# Patient Record
Sex: FEMALE | Race: WHITE | NOT HISPANIC OR LATINO | URBAN - METROPOLITAN AREA
[De-identification: names, ages, dates, MRNs, and addresses within clinical notes are randomized per-mention and may not be internally consistent; named-entity substitution may affect disease eponyms.]

---

## 2017-01-30 ENCOUNTER — OFFICE VISIT - HEALTHEAST (OUTPATIENT)
Dept: GERIATRICS | Facility: CLINIC | Age: 72
End: 2017-01-30

## 2017-01-30 DIAGNOSIS — F22 DELUSIONAL DISORDER, SOMATIC TYPE (H): ICD-10-CM

## 2017-01-30 DIAGNOSIS — G47.00 INSOMNIA, UNSPECIFIED TYPE: ICD-10-CM

## 2017-01-30 DIAGNOSIS — F32.A DEPRESSION: ICD-10-CM

## 2017-01-30 DIAGNOSIS — F43.10 PTSD (POST-TRAUMATIC STRESS DISORDER): ICD-10-CM

## 2017-01-30 DIAGNOSIS — F41.9 ANXIETY: ICD-10-CM

## 2017-01-30 DIAGNOSIS — K59.00 CONSTIPATION, UNSPECIFIED CONSTIPATION TYPE: ICD-10-CM

## 2017-03-30 ENCOUNTER — OFFICE VISIT - HEALTHEAST (OUTPATIENT)
Dept: GERIATRICS | Facility: CLINIC | Age: 72
End: 2017-03-30

## 2017-03-30 DIAGNOSIS — F45.0 SOMATIZATION DISORDER: ICD-10-CM

## 2017-03-30 DIAGNOSIS — F22 DELUSIONAL DISORDER, SOMATIC TYPE (H): ICD-10-CM

## 2017-03-30 DIAGNOSIS — K59.00 CONSTIPATION, UNSPECIFIED CONSTIPATION TYPE: ICD-10-CM

## 2017-03-30 DIAGNOSIS — F32.A DEPRESSION: ICD-10-CM

## 2017-03-30 DIAGNOSIS — F43.10 PTSD (POST-TRAUMATIC STRESS DISORDER): ICD-10-CM

## 2017-03-30 DIAGNOSIS — F41.9 ANXIETY: ICD-10-CM

## 2017-04-11 ENCOUNTER — OFFICE VISIT - HEALTHEAST (OUTPATIENT)
Dept: GERIATRICS | Facility: CLINIC | Age: 72
End: 2017-04-11

## 2017-04-11 DIAGNOSIS — G47.00 INSOMNIA, UNSPECIFIED TYPE: ICD-10-CM

## 2017-04-11 DIAGNOSIS — K59.00 CONSTIPATION, UNSPECIFIED CONSTIPATION TYPE: ICD-10-CM

## 2017-04-11 DIAGNOSIS — F22 DELUSIONAL DISORDER, SOMATIC TYPE (H): ICD-10-CM

## 2017-04-11 DIAGNOSIS — F41.9 ANXIETY: ICD-10-CM

## 2017-04-11 DIAGNOSIS — G20.C PARKINSONISM, UNSPECIFIED PARKINSONISM TYPE (H): ICD-10-CM

## 2017-04-11 DIAGNOSIS — F43.10 PTSD (POST-TRAUMATIC STRESS DISORDER): ICD-10-CM

## 2017-04-11 DIAGNOSIS — F32.A DEPRESSION: ICD-10-CM

## 2017-04-28 ENCOUNTER — OFFICE VISIT - HEALTHEAST (OUTPATIENT)
Dept: GERIATRICS | Facility: CLINIC | Age: 72
End: 2017-04-28

## 2017-04-28 ENCOUNTER — RECORDS - HEALTHEAST (OUTPATIENT)
Dept: ADMINISTRATIVE | Facility: OTHER | Age: 72
End: 2017-04-28

## 2017-04-28 DIAGNOSIS — F43.10 PTSD (POST-TRAUMATIC STRESS DISORDER): ICD-10-CM

## 2017-04-28 DIAGNOSIS — F32.A DEPRESSION: ICD-10-CM

## 2017-04-28 DIAGNOSIS — G21.9 SECONDARY PARKINSONISM, UNSPECIFIED SECONDARY PARKINSONISM TYPE (H): ICD-10-CM

## 2017-04-28 DIAGNOSIS — K59.00 CONSTIPATION, UNSPECIFIED CONSTIPATION TYPE: ICD-10-CM

## 2017-04-28 DIAGNOSIS — F22 DELUSIONAL DISORDER, SOMATIC TYPE (H): ICD-10-CM

## 2017-04-28 DIAGNOSIS — F50.9 EATING DISORDER: ICD-10-CM

## 2017-04-28 DIAGNOSIS — H26.9 CATARACTS, BILATERAL: ICD-10-CM

## 2017-04-28 DIAGNOSIS — G47.00 INSOMNIA, UNSPECIFIED TYPE: ICD-10-CM

## 2017-04-28 DIAGNOSIS — F41.9 ANXIETY: ICD-10-CM

## 2017-04-28 RX ORDER — ZALEPLON 10 MG/1
10 CAPSULE ORAL
Status: SHIPPED | COMMUNITY
Start: 2016-09-30 | End: 2023-02-23

## 2017-04-28 RX ORDER — DEXTROMETHORPHAN HBR. AND GUAIFENESIN 10; 100 MG/5ML; MG/5ML
5 SOLUTION ORAL EVERY 4 HOURS PRN
Status: SHIPPED | COMMUNITY
Start: 2017-04-28 | End: 2023-02-23

## 2017-05-08 ENCOUNTER — OFFICE VISIT - HEALTHEAST (OUTPATIENT)
Dept: CARDIOLOGY | Facility: CLINIC | Age: 72
End: 2017-05-08

## 2017-05-08 DIAGNOSIS — I44.7 LBBB (LEFT BUNDLE BRANCH BLOCK): ICD-10-CM

## 2017-05-08 DIAGNOSIS — Z01.810 PREOP CARDIOVASCULAR EXAM: ICD-10-CM

## 2017-05-08 LAB
ATRIAL RATE - MUSE: 80 BPM
DIASTOLIC BLOOD PRESSURE - MUSE: NORMAL MMHG
INTERPRETATION ECG - MUSE: NORMAL
P AXIS - MUSE: 60 DEGREES
PR INTERVAL - MUSE: 124 MS
QRS DURATION - MUSE: 134 MS
QT - MUSE: 430 MS
QTC - MUSE: 495 MS
R AXIS - MUSE: -13 DEGREES
SYSTOLIC BLOOD PRESSURE - MUSE: NORMAL MMHG
T AXIS - MUSE: 75 DEGREES
VENTRICULAR RATE- MUSE: 80 BPM

## 2017-05-08 RX ORDER — MOXIFLOXACIN 5 MG/ML
1 SOLUTION/ DROPS OPHTHALMIC 4 TIMES DAILY
Status: SHIPPED | COMMUNITY
Start: 2017-05-08 | End: 2023-02-23

## 2017-05-08 ASSESSMENT — MIFFLIN-ST. JEOR: SCORE: 1134.06

## 2017-05-30 ENCOUNTER — OFFICE VISIT - HEALTHEAST (OUTPATIENT)
Dept: GERIATRICS | Facility: CLINIC | Age: 72
End: 2017-05-30

## 2017-05-30 DIAGNOSIS — F22 DELUSIONAL DISORDER, SOMATIC TYPE (H): ICD-10-CM

## 2017-05-30 DIAGNOSIS — F32.A DEPRESSION, UNSPECIFIED DEPRESSION TYPE: ICD-10-CM

## 2017-05-30 DIAGNOSIS — F50.9 EATING DISORDER: ICD-10-CM

## 2017-05-30 DIAGNOSIS — G21.9 SECONDARY PARKINSONISM, UNSPECIFIED SECONDARY PARKINSONISM TYPE (H): ICD-10-CM

## 2017-05-30 DIAGNOSIS — F43.10 PTSD (POST-TRAUMATIC STRESS DISORDER): ICD-10-CM

## 2017-05-30 DIAGNOSIS — F41.9 ANXIETY: ICD-10-CM

## 2017-05-30 DIAGNOSIS — K59.00 CONSTIPATION, UNSPECIFIED CONSTIPATION TYPE: ICD-10-CM

## 2017-05-30 DIAGNOSIS — G47.00 INSOMNIA, UNSPECIFIED TYPE: ICD-10-CM

## 2017-05-30 RX ORDER — MAGNESIUM HYDROXIDE/ALUMINUM HYDROXICE/SIMETHICONE 120; 1200; 1200 MG/30ML; MG/30ML; MG/30ML
30 SUSPENSION ORAL 3 TIMES DAILY PRN
Status: SHIPPED | COMMUNITY
Start: 2017-05-30 | End: 2023-02-23

## 2017-05-30 ASSESSMENT — MIFFLIN-ST. JEOR: SCORE: 1129.53

## 2017-06-28 ENCOUNTER — OFFICE VISIT - HEALTHEAST (OUTPATIENT)
Dept: GERIATRICS | Facility: CLINIC | Age: 72
End: 2017-06-28

## 2017-06-28 DIAGNOSIS — F41.9 ANXIETY: ICD-10-CM

## 2017-06-28 DIAGNOSIS — F22 DELUSIONAL DISORDER, SOMATIC TYPE (H): ICD-10-CM

## 2017-06-28 DIAGNOSIS — R52 PAIN MANAGEMENT: ICD-10-CM

## 2017-06-28 DIAGNOSIS — F32.A DEPRESSION, UNSPECIFIED DEPRESSION TYPE: ICD-10-CM

## 2017-06-28 DIAGNOSIS — G47.00 INSOMNIA, UNSPECIFIED TYPE: ICD-10-CM

## 2017-07-18 ENCOUNTER — OFFICE VISIT - HEALTHEAST (OUTPATIENT)
Dept: GERIATRICS | Facility: CLINIC | Age: 72
End: 2017-07-18

## 2017-07-18 DIAGNOSIS — F50.9 EATING DISORDER: ICD-10-CM

## 2017-07-18 DIAGNOSIS — F22 DELUSIONAL DISORDER, SOMATIC TYPE (H): ICD-10-CM

## 2017-07-18 DIAGNOSIS — F43.10 PTSD (POST-TRAUMATIC STRESS DISORDER): ICD-10-CM

## 2017-07-18 DIAGNOSIS — G21.9 SECONDARY PARKINSONISM, UNSPECIFIED SECONDARY PARKINSONISM TYPE (H): ICD-10-CM

## 2017-07-18 DIAGNOSIS — G47.00 INSOMNIA, UNSPECIFIED TYPE: ICD-10-CM

## 2017-07-18 DIAGNOSIS — F32.A DEPRESSION, UNSPECIFIED DEPRESSION TYPE: ICD-10-CM

## 2017-07-18 DIAGNOSIS — K59.00 CONSTIPATION, UNSPECIFIED CONSTIPATION TYPE: ICD-10-CM

## 2017-07-18 ASSESSMENT — MIFFLIN-ST. JEOR: SCORE: 1184.86

## 2017-08-15 ENCOUNTER — OFFICE VISIT - HEALTHEAST (OUTPATIENT)
Dept: GERIATRICS | Facility: CLINIC | Age: 72
End: 2017-08-15

## 2017-08-15 DIAGNOSIS — F50.9 EATING DISORDER: ICD-10-CM

## 2017-08-15 DIAGNOSIS — F32.A DEPRESSION, UNSPECIFIED DEPRESSION TYPE: ICD-10-CM

## 2017-08-15 DIAGNOSIS — F43.10 PTSD (POST-TRAUMATIC STRESS DISORDER): ICD-10-CM

## 2017-08-15 DIAGNOSIS — G47.00 INSOMNIA, UNSPECIFIED TYPE: ICD-10-CM

## 2017-08-15 DIAGNOSIS — G25.81 RLS (RESTLESS LEGS SYNDROME): ICD-10-CM

## 2017-08-15 DIAGNOSIS — F22 DELUSIONAL DISORDER, SOMATIC TYPE (H): ICD-10-CM

## 2017-08-15 DIAGNOSIS — K59.00 CONSTIPATION, UNSPECIFIED CONSTIPATION TYPE: ICD-10-CM

## 2017-08-15 DIAGNOSIS — G21.9 SECONDARY PARKINSONISM, UNSPECIFIED SECONDARY PARKINSONISM TYPE (H): ICD-10-CM

## 2017-08-17 ASSESSMENT — MIFFLIN-ST. JEOR: SCORE: 1214.8

## 2017-08-29 ENCOUNTER — AMBULATORY - HEALTHEAST (OUTPATIENT)
Dept: GERIATRICS | Facility: CLINIC | Age: 72
End: 2017-08-29

## 2017-08-29 RX ORDER — ROPINIROLE 1 MG/1
2 TABLET, FILM COATED ORAL
Status: SHIPPED | COMMUNITY
Start: 2017-11-24 | End: 2023-02-23

## 2017-09-27 ENCOUNTER — OFFICE VISIT - HEALTHEAST (OUTPATIENT)
Dept: GERIATRICS | Facility: CLINIC | Age: 72
End: 2017-09-27

## 2017-09-27 DIAGNOSIS — K59.00 CONSTIPATION, UNSPECIFIED CONSTIPATION TYPE: ICD-10-CM

## 2017-09-27 DIAGNOSIS — F22 DELUSIONAL DISORDER, SOMATIC TYPE (H): ICD-10-CM

## 2017-09-27 DIAGNOSIS — F41.9 ANXIETY: ICD-10-CM

## 2017-09-27 DIAGNOSIS — F50.9 EATING DISORDER: ICD-10-CM

## 2017-09-27 DIAGNOSIS — F32.A DEPRESSION, UNSPECIFIED DEPRESSION TYPE: ICD-10-CM

## 2017-09-27 DIAGNOSIS — G21.9 SECONDARY PARKINSONISM, UNSPECIFIED SECONDARY PARKINSONISM TYPE (H): ICD-10-CM

## 2017-09-27 DIAGNOSIS — R63.5 WEIGHT GAIN: ICD-10-CM

## 2017-09-27 DIAGNOSIS — G47.00 INSOMNIA, UNSPECIFIED TYPE: ICD-10-CM

## 2017-09-27 DIAGNOSIS — R52 PAIN MANAGEMENT: ICD-10-CM

## 2017-09-27 DIAGNOSIS — F43.10 PTSD (POST-TRAUMATIC STRESS DISORDER): ICD-10-CM

## 2017-10-20 ENCOUNTER — OFFICE VISIT - HEALTHEAST (OUTPATIENT)
Dept: GERIATRICS | Facility: CLINIC | Age: 72
End: 2017-10-20

## 2017-10-20 DIAGNOSIS — G21.9 SECONDARY PARKINSONISM, UNSPECIFIED SECONDARY PARKINSONISM TYPE (H): ICD-10-CM

## 2017-10-20 DIAGNOSIS — F51.04 PSYCHOPHYSIOLOGICAL INSOMNIA: ICD-10-CM

## 2017-10-20 DIAGNOSIS — F43.10 PTSD (POST-TRAUMATIC STRESS DISORDER): ICD-10-CM

## 2017-10-20 DIAGNOSIS — G25.81 RLS (RESTLESS LEGS SYNDROME): ICD-10-CM

## 2017-10-20 DIAGNOSIS — F22 DELUSIONAL DISORDER, SOMATIC TYPE (H): ICD-10-CM

## 2017-10-20 DIAGNOSIS — K59.00 CONSTIPATION, UNSPECIFIED CONSTIPATION TYPE: ICD-10-CM

## 2017-10-20 DIAGNOSIS — F50.9 EATING DISORDER: ICD-10-CM

## 2017-10-20 DIAGNOSIS — F32.A DEPRESSION, UNSPECIFIED DEPRESSION TYPE: ICD-10-CM

## 2017-10-24 RX ORDER — FLUVOXAMINE MALEATE 50 MG
50 TABLET ORAL 2 TIMES DAILY
Status: SHIPPED | COMMUNITY
Start: 2017-08-30 | End: 2023-02-23

## 2017-10-24 ASSESSMENT — MIFFLIN-ST. JEOR: SCORE: 1233.4

## 2017-11-02 ENCOUNTER — AMBULATORY - HEALTHEAST (OUTPATIENT)
Dept: GERIATRICS | Facility: CLINIC | Age: 72
End: 2017-11-02

## 2017-11-03 ENCOUNTER — AMBULATORY - HEALTHEAST (OUTPATIENT)
Dept: GERIATRICS | Facility: CLINIC | Age: 72
End: 2017-11-03

## 2017-11-07 ENCOUNTER — AMBULATORY - HEALTHEAST (OUTPATIENT)
Dept: GERIATRICS | Facility: CLINIC | Age: 72
End: 2017-11-07

## 2017-11-24 ENCOUNTER — OFFICE VISIT - HEALTHEAST (OUTPATIENT)
Dept: GERIATRICS | Facility: CLINIC | Age: 72
End: 2017-11-24

## 2017-11-24 DIAGNOSIS — F22 DELUSIONAL DISORDER, SOMATIC TYPE (H): ICD-10-CM

## 2017-11-24 DIAGNOSIS — R19.8 IRREGULAR BOWEL HABITS: ICD-10-CM

## 2017-11-24 DIAGNOSIS — G25.81 RLS (RESTLESS LEGS SYNDROME): ICD-10-CM

## 2017-11-24 DIAGNOSIS — F50.9 EATING DISORDER: ICD-10-CM

## 2017-11-24 DIAGNOSIS — F43.10 PTSD (POST-TRAUMATIC STRESS DISORDER): ICD-10-CM

## 2017-11-24 DIAGNOSIS — F32.A DEPRESSION, UNSPECIFIED DEPRESSION TYPE: ICD-10-CM

## 2017-11-24 DIAGNOSIS — G21.9 SECONDARY PARKINSONISM, UNSPECIFIED SECONDARY PARKINSONISM TYPE (H): ICD-10-CM

## 2017-11-24 DIAGNOSIS — F51.04 PSYCHOPHYSIOLOGICAL INSOMNIA: ICD-10-CM

## 2017-11-24 RX ORDER — LOPERAMIDE HYDROCHLORIDE 2 MG/1
2 TABLET ORAL 4 TIMES DAILY PRN
Status: SHIPPED | COMMUNITY
Start: 2017-11-24 | End: 2023-02-23

## 2017-11-24 ASSESSMENT — MIFFLIN-ST. JEOR: SCORE: 1241.11

## 2017-11-26 RX ORDER — FUROSEMIDE 20 MG
20 TABLET ORAL DAILY
Status: SHIPPED | COMMUNITY
Start: 2017-11-01 | End: 2023-02-23

## 2018-01-30 ENCOUNTER — OFFICE VISIT - HEALTHEAST (OUTPATIENT)
Dept: GERIATRICS | Facility: CLINIC | Age: 73
End: 2018-01-30

## 2018-01-30 DIAGNOSIS — F43.10 PTSD (POST-TRAUMATIC STRESS DISORDER): ICD-10-CM

## 2018-01-30 DIAGNOSIS — K59.00 CONSTIPATION, UNSPECIFIED CONSTIPATION TYPE: ICD-10-CM

## 2018-01-30 DIAGNOSIS — R19.8 IRREGULAR BOWEL HABITS: ICD-10-CM

## 2018-01-30 DIAGNOSIS — F22 DELUSIONAL DISORDER, SOMATIC TYPE (H): ICD-10-CM

## 2018-01-30 DIAGNOSIS — R52 PAIN MANAGEMENT: ICD-10-CM

## 2018-02-16 ENCOUNTER — AMBULATORY - HEALTHEAST (OUTPATIENT)
Dept: GERIATRICS | Facility: CLINIC | Age: 73
End: 2018-02-16

## 2018-02-22 ENCOUNTER — AMBULATORY - HEALTHEAST (OUTPATIENT)
Dept: GERIATRICS | Facility: CLINIC | Age: 73
End: 2018-02-22

## 2018-02-22 RX ORDER — AMOXICILLIN 250 MG
1 CAPSULE ORAL 2 TIMES DAILY PRN
Status: SHIPPED | COMMUNITY
Start: 2018-02-22

## 2018-03-02 ENCOUNTER — AMBULATORY - HEALTHEAST (OUTPATIENT)
Dept: GERIATRICS | Facility: CLINIC | Age: 73
End: 2018-03-02

## 2019-05-14 ENCOUNTER — RECORDS - HEALTHEAST (OUTPATIENT)
Dept: LAB | Facility: CLINIC | Age: 74
End: 2019-05-14

## 2019-05-14 LAB
25(OH)D3 SERPL-MCNC: 29 NG/ML (ref 30–80)
ALBUMIN SERPL-MCNC: 4.3 G/DL (ref 3.5–5)
ALP SERPL-CCNC: 105 U/L (ref 45–120)
ALT SERPL W P-5'-P-CCNC: 34 U/L (ref 0–45)
ANION GAP SERPL CALCULATED.3IONS-SCNC: 13 MMOL/L (ref 5–18)
AST SERPL W P-5'-P-CCNC: 35 U/L (ref 0–40)
BILIRUB SERPL-MCNC: 0.6 MG/DL (ref 0–1)
BUN SERPL-MCNC: 18 MG/DL (ref 8–28)
CALCIUM SERPL-MCNC: 10.1 MG/DL (ref 8.5–10.5)
CHLORIDE BLD-SCNC: 102 MMOL/L (ref 98–107)
CHOLEST SERPL-MCNC: 180 MG/DL
CO2 SERPL-SCNC: 21 MMOL/L (ref 22–31)
CREAT SERPL-MCNC: 0.81 MG/DL (ref 0.6–1.1)
ERYTHROCYTE [DISTWIDTH] IN BLOOD BY AUTOMATED COUNT: 15.9 % (ref 11–14.5)
FASTING STATUS PATIENT QL REPORTED: NORMAL
FERRITIN SERPL-MCNC: 47 NG/ML (ref 10–130)
GFR SERPL CREATININE-BSD FRML MDRD: >60 ML/MIN/1.73M2
GLUCOSE BLD-MCNC: 82 MG/DL (ref 70–125)
HCT VFR BLD AUTO: 45.7 % (ref 35–47)
HDLC SERPL-MCNC: 88 MG/DL
HGB BLD-MCNC: 14.7 G/DL (ref 12–16)
IRON SERPL-MCNC: 122 UG/DL (ref 42–175)
LDLC SERPL CALC-MCNC: 74 MG/DL
MCH RBC QN AUTO: 28.8 PG (ref 27–34)
MCHC RBC AUTO-ENTMCNC: 32.2 G/DL (ref 32–36)
MCV RBC AUTO: 89 FL (ref 80–100)
PLATELET # BLD AUTO: 274 THOU/UL (ref 140–440)
PMV BLD AUTO: 10.1 FL (ref 8.5–12.5)
POTASSIUM BLD-SCNC: 4.2 MMOL/L (ref 3.5–5)
PROT SERPL-MCNC: 7.8 G/DL (ref 6–8)
RBC # BLD AUTO: 5.11 MILL/UL (ref 3.8–5.4)
SODIUM SERPL-SCNC: 136 MMOL/L (ref 136–145)
TRIGL SERPL-MCNC: 89 MG/DL
TSH SERPL DL<=0.005 MIU/L-ACNC: 3.6 UIU/ML (ref 0.3–5)
VIT B12 SERPL-MCNC: 590 PG/ML (ref 213–816)
WBC: 8.4 THOU/UL (ref 4–11)

## 2019-06-11 ENCOUNTER — RECORDS - HEALTHEAST (OUTPATIENT)
Dept: LAB | Facility: CLINIC | Age: 74
End: 2019-06-11

## 2019-06-11 LAB
ANION GAP SERPL CALCULATED.3IONS-SCNC: 10 MMOL/L (ref 5–18)
BASOPHILS # BLD AUTO: 0.1 THOU/UL (ref 0–0.2)
BASOPHILS NFR BLD AUTO: 1 % (ref 0–2)
BUN SERPL-MCNC: 21 MG/DL (ref 8–28)
CALCIUM SERPL-MCNC: 9.8 MG/DL (ref 8.5–10.5)
CHLORIDE BLD-SCNC: 103 MMOL/L (ref 98–107)
CO2 SERPL-SCNC: 23 MMOL/L (ref 22–31)
CREAT SERPL-MCNC: 0.83 MG/DL (ref 0.6–1.1)
EOSINOPHIL # BLD AUTO: 0.4 THOU/UL (ref 0–0.4)
EOSINOPHIL NFR BLD AUTO: 5 % (ref 0–6)
ERYTHROCYTE [DISTWIDTH] IN BLOOD BY AUTOMATED COUNT: 15.9 % (ref 11–14.5)
GFR SERPL CREATININE-BSD FRML MDRD: >60 ML/MIN/1.73M2
GLUCOSE BLD-MCNC: 115 MG/DL (ref 70–125)
HCT VFR BLD AUTO: 39.5 % (ref 35–47)
HGB BLD-MCNC: 12.9 G/DL (ref 12–16)
LYMPHOCYTES # BLD AUTO: 2.5 THOU/UL (ref 0.8–4.4)
LYMPHOCYTES NFR BLD AUTO: 35 % (ref 20–40)
MCH RBC QN AUTO: 29.5 PG (ref 27–34)
MCHC RBC AUTO-ENTMCNC: 32.7 G/DL (ref 32–36)
MCV RBC AUTO: 90 FL (ref 80–100)
MONOCYTES # BLD AUTO: 0.6 THOU/UL (ref 0–0.9)
MONOCYTES NFR BLD AUTO: 8 % (ref 2–10)
NEUTROPHILS # BLD AUTO: 3.7 THOU/UL (ref 2–7.7)
NEUTROPHILS NFR BLD AUTO: 52 % (ref 50–70)
PLATELET # BLD AUTO: 245 THOU/UL (ref 140–440)
PMV BLD AUTO: 9.5 FL (ref 8.5–12.5)
POTASSIUM BLD-SCNC: 4.3 MMOL/L (ref 3.5–5)
RBC # BLD AUTO: 4.38 MILL/UL (ref 3.8–5.4)
SODIUM SERPL-SCNC: 136 MMOL/L (ref 136–145)
WBC: 7.2 THOU/UL (ref 4–11)

## 2019-07-05 ENCOUNTER — RECORDS - HEALTHEAST (OUTPATIENT)
Dept: LAB | Facility: CLINIC | Age: 74
End: 2019-07-05

## 2019-07-05 LAB
ANION GAP SERPL CALCULATED.3IONS-SCNC: 10 MMOL/L (ref 5–18)
BUN SERPL-MCNC: 21 MG/DL (ref 8–28)
CALCIUM SERPL-MCNC: 10 MG/DL (ref 8.5–10.5)
CHLORIDE BLD-SCNC: 105 MMOL/L (ref 98–107)
CO2 SERPL-SCNC: 23 MMOL/L (ref 22–31)
CREAT SERPL-MCNC: 0.81 MG/DL (ref 0.6–1.1)
GFR SERPL CREATININE-BSD FRML MDRD: >60 ML/MIN/1.73M2
GLUCOSE BLD-MCNC: 88 MG/DL (ref 70–125)
POTASSIUM BLD-SCNC: 4.2 MMOL/L (ref 3.5–5)
SODIUM SERPL-SCNC: 138 MMOL/L (ref 136–145)

## 2019-10-01 ENCOUNTER — RECORDS - HEALTHEAST (OUTPATIENT)
Dept: LAB | Facility: CLINIC | Age: 74
End: 2019-10-01

## 2019-10-01 LAB
CHOLEST SERPL-MCNC: 152 MG/DL
FASTING STATUS PATIENT QL REPORTED: YES
HDLC SERPL-MCNC: 84 MG/DL
LDLC SERPL CALC-MCNC: 50 MG/DL
TRIGL SERPL-MCNC: 91 MG/DL

## 2020-07-21 ENCOUNTER — RECORDS - HEALTHEAST (OUTPATIENT)
Dept: LAB | Facility: CLINIC | Age: 75
End: 2020-07-21

## 2020-07-24 LAB
SARS-COV-2 BY NAA - HISTORICAL: NOT DETECTED
SARS-COV-2 SOURCE - HISTORICAL: NORMAL

## 2021-02-27 ENCOUNTER — RECORDS - HEALTHEAST (OUTPATIENT)
Dept: LAB | Facility: HOSPITAL | Age: 76
End: 2021-02-27

## 2021-02-27 LAB
SARS-COV-2 PCR COMMENT: NORMAL
SARS-COV-2 RNA SPEC QL NAA+PROBE: NEGATIVE
SARS-COV-2 VIRUS SPECIMEN SOURCE: NORMAL

## 2021-04-02 ENCOUNTER — RECORDS - HEALTHEAST (OUTPATIENT)
Dept: LAB | Facility: CLINIC | Age: 76
End: 2021-04-02

## 2021-05-30 VITALS — WEIGHT: 150 LBS

## 2021-05-30 VITALS — WEIGHT: 134.9 LBS

## 2021-05-31 VITALS — HEIGHT: 63 IN | WEIGHT: 151 LBS | BODY MASS INDEX: 26.75 KG/M2

## 2021-05-31 VITALS — HEIGHT: 63 IN | BODY MASS INDEX: 30.94 KG/M2 | WEIGHT: 174.6 LBS

## 2021-05-31 VITALS — WEIGHT: 168.8 LBS | HEIGHT: 63 IN | BODY MASS INDEX: 29.91 KG/M2

## 2021-05-31 VITALS — BODY MASS INDEX: 30.64 KG/M2 | HEIGHT: 63 IN | WEIGHT: 172.9 LBS

## 2021-05-31 VITALS — WEIGHT: 162.2 LBS | BODY MASS INDEX: 28.74 KG/M2 | HEIGHT: 63 IN

## 2021-05-31 VITALS — HEIGHT: 63 IN | WEIGHT: 150 LBS | BODY MASS INDEX: 26.58 KG/M2
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
## 2021-06-15 PROBLEM — H26.9 CATARACTS, BILATERAL: Status: ACTIVE | Noted: 2017-04-28
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
## 2021-06-16 PROBLEM — G25.81 RLS (RESTLESS LEGS SYNDROME): Status: ACTIVE | Noted: 2017-08-17

## 2021-06-16 PROBLEM — R19.8 IRREGULAR BOWEL HABITS: Status: ACTIVE | Noted: 2017-11-24
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
## 2021-08-31 ENCOUNTER — LAB REQUISITION (OUTPATIENT)
Dept: LAB | Facility: CLINIC | Age: 76
End: 2021-08-31

## 2021-08-31 DIAGNOSIS — U07.1 COVID-19: ICD-10-CM

## 2021-09-07 ENCOUNTER — LAB REQUISITION (OUTPATIENT)
Dept: LAB | Facility: CLINIC | Age: 76
End: 2021-09-07

## 2021-09-07 DIAGNOSIS — U07.1 COVID-19: ICD-10-CM

## 2021-11-30 ENCOUNTER — LAB REQUISITION (OUTPATIENT)
Dept: LAB | Facility: CLINIC | Age: 76
End: 2021-11-30

## 2021-11-30 DIAGNOSIS — U07.1 COVID-19: ICD-10-CM

## 2021-12-07 ENCOUNTER — LAB REQUISITION (OUTPATIENT)
Dept: LAB | Facility: CLINIC | Age: 76
End: 2021-12-07

## 2021-12-07 DIAGNOSIS — U07.1 COVID-19: ICD-10-CM

## 2021-12-13 ENCOUNTER — LAB REQUISITION (OUTPATIENT)
Dept: LAB | Facility: CLINIC | Age: 76
End: 2021-12-13

## 2021-12-13 DIAGNOSIS — U07.1 COVID-19: ICD-10-CM

## 2021-12-20 ENCOUNTER — LAB REQUISITION (OUTPATIENT)
Dept: LAB | Facility: CLINIC | Age: 76
End: 2021-12-20

## 2021-12-20 DIAGNOSIS — U07.1 COVID-19: ICD-10-CM

## 2021-12-27 ENCOUNTER — LAB REQUISITION (OUTPATIENT)
Dept: LAB | Facility: CLINIC | Age: 76
End: 2021-12-27

## 2021-12-27 DIAGNOSIS — U07.1 COVID-19: ICD-10-CM

## 2022-01-03 ENCOUNTER — LAB REQUISITION (OUTPATIENT)
Dept: LAB | Facility: CLINIC | Age: 77
End: 2022-01-03

## 2022-01-03 DIAGNOSIS — U07.1 COVID-19: ICD-10-CM

## 2022-01-07 ENCOUNTER — LAB REQUISITION (OUTPATIENT)
Dept: LAB | Facility: CLINIC | Age: 77
End: 2022-01-07

## 2022-01-07 DIAGNOSIS — U07.1 COVID-19: ICD-10-CM

## 2022-01-17 ENCOUNTER — LAB REQUISITION (OUTPATIENT)
Dept: LAB | Facility: CLINIC | Age: 77
End: 2022-01-17
Payer: MEDICARE

## 2022-01-17 DIAGNOSIS — U07.1 COVID-19: ICD-10-CM

## 2022-01-18 PROCEDURE — U0003 INFECTIOUS AGENT DETECTION BY NUCLEIC ACID (DNA OR RNA); SEVERE ACUTE RESPIRATORY SYNDROME CORONAVIRUS 2 (SARS-COV-2) (CORONAVIRUS DISEASE [COVID-19]), AMPLIFIED PROBE TECHNIQUE, MAKING USE OF HIGH THROUGHPUT TECHNOLOGIES AS DESCRIBED BY CMS-2020-01-R: HCPCS | Mod: ORL | Performed by: INTERNAL MEDICINE

## 2022-01-19 LAB — SARS-COV-2 RNA RESP QL NAA+PROBE: NEGATIVE

## 2022-01-25 ENCOUNTER — LAB REQUISITION (OUTPATIENT)
Dept: LAB | Facility: CLINIC | Age: 77
End: 2022-01-25
Payer: MEDICARE

## 2022-01-25 DIAGNOSIS — U07.1 COVID-19: ICD-10-CM

## 2022-01-26 PROCEDURE — U0005 INFEC AGEN DETEC AMPLI PROBE: HCPCS | Mod: ORL | Performed by: INTERNAL MEDICINE

## 2022-01-28 LAB — SARS-COV-2 RNA RESP QL NAA+PROBE: NOT DETECTED

## 2022-02-01 ENCOUNTER — LAB REQUISITION (OUTPATIENT)
Dept: LAB | Facility: CLINIC | Age: 77
End: 2022-02-01
Payer: MEDICARE

## 2022-02-01 DIAGNOSIS — U07.1 COVID-19: ICD-10-CM

## 2022-02-02 PROCEDURE — U0003 INFECTIOUS AGENT DETECTION BY NUCLEIC ACID (DNA OR RNA); SEVERE ACUTE RESPIRATORY SYNDROME CORONAVIRUS 2 (SARS-COV-2) (CORONAVIRUS DISEASE [COVID-19]), AMPLIFIED PROBE TECHNIQUE, MAKING USE OF HIGH THROUGHPUT TECHNOLOGIES AS DESCRIBED BY CMS-2020-01-R: HCPCS | Mod: ORL | Performed by: INTERNAL MEDICINE

## 2022-02-04 LAB — SARS-COV-2 RNA RESP QL NAA+PROBE: NOT DETECTED

## 2022-02-14 ENCOUNTER — LAB REQUISITION (OUTPATIENT)
Dept: LAB | Facility: CLINIC | Age: 77
End: 2022-02-14
Payer: MEDICARE

## 2022-02-14 DIAGNOSIS — U07.1 COVID-19: ICD-10-CM

## 2022-02-15 PROCEDURE — U0005 INFEC AGEN DETEC AMPLI PROBE: HCPCS | Mod: ORL | Performed by: INTERNAL MEDICINE

## 2022-02-16 LAB — SARS-COV-2 RNA RESP QL NAA+PROBE: NEGATIVE

## 2022-05-02 ENCOUNTER — LAB REQUISITION (OUTPATIENT)
Dept: LAB | Facility: CLINIC | Age: 77
End: 2022-05-02
Payer: MEDICARE

## 2022-05-02 DIAGNOSIS — U07.1 COVID-19: ICD-10-CM

## 2022-05-03 PROCEDURE — U0003 INFECTIOUS AGENT DETECTION BY NUCLEIC ACID (DNA OR RNA); SEVERE ACUTE RESPIRATORY SYNDROME CORONAVIRUS 2 (SARS-COV-2) (CORONAVIRUS DISEASE [COVID-19]), AMPLIFIED PROBE TECHNIQUE, MAKING USE OF HIGH THROUGHPUT TECHNOLOGIES AS DESCRIBED BY CMS-2020-01-R: HCPCS | Mod: ORL | Performed by: INTERNAL MEDICINE

## 2022-05-04 LAB — SARS-COV-2 RNA RESP QL NAA+PROBE: NEGATIVE

## 2022-05-06 ENCOUNTER — LAB REQUISITION (OUTPATIENT)
Dept: LAB | Facility: CLINIC | Age: 77
End: 2022-05-06
Payer: MEDICARE

## 2022-05-06 DIAGNOSIS — U07.1 COVID-19: ICD-10-CM

## 2022-05-09 PROCEDURE — U0005 INFEC AGEN DETEC AMPLI PROBE: HCPCS | Mod: ORL | Performed by: INTERNAL MEDICINE

## 2022-05-10 LAB — SARS-COV-2 RNA RESP QL NAA+PROBE: NEGATIVE

## 2022-05-13 ENCOUNTER — LAB REQUISITION (OUTPATIENT)
Dept: LAB | Facility: CLINIC | Age: 77
End: 2022-05-13
Payer: MEDICARE

## 2022-05-13 DIAGNOSIS — U07.1 COVID-19: ICD-10-CM

## 2022-05-16 PROCEDURE — U0003 INFECTIOUS AGENT DETECTION BY NUCLEIC ACID (DNA OR RNA); SEVERE ACUTE RESPIRATORY SYNDROME CORONAVIRUS 2 (SARS-COV-2) (CORONAVIRUS DISEASE [COVID-19]), AMPLIFIED PROBE TECHNIQUE, MAKING USE OF HIGH THROUGHPUT TECHNOLOGIES AS DESCRIBED BY CMS-2020-01-R: HCPCS | Mod: ORL | Performed by: INTERNAL MEDICINE

## 2022-05-17 LAB — SARS-COV-2 RNA RESP QL NAA+PROBE: POSITIVE

## 2023-02-08 ENCOUNTER — MEDICAL CORRESPONDENCE (OUTPATIENT)
Dept: HEALTH INFORMATION MANAGEMENT | Facility: CLINIC | Age: 78
End: 2023-02-08

## 2023-02-15 NOTE — PROGRESS NOTES
Ana Butter[birth year]asim [birth year] New Admission   wt 135   /67   P 68   R 16   T 97.0   O2 99% RA   I will get her signed consent over to you before she is seen.(done)  She was admitted on 2/8/22 from [facility] where she has been since September 2022 for Psychosis.   She has dx of MDD, PTSD, Personality d/o, anxiety d/o, somatization disorder, GERD, HTN, ? COPD,   she has an order for Incruse inhaler daily but has no dx for it, is a former smoker.     Reported by Mat/  ARACELIS

## 2023-02-16 ENCOUNTER — NURSING HOME VISIT (OUTPATIENT)
Dept: FAMILY MEDICINE | Facility: CLINIC | Age: 78
End: 2023-02-16
Payer: MEDICARE

## 2023-02-16 DIAGNOSIS — F50.9 EATING DISORDER, UNSPECIFIED TYPE: ICD-10-CM

## 2023-02-16 DIAGNOSIS — F44.9 CONVERSION DISORDER: ICD-10-CM

## 2023-02-16 DIAGNOSIS — R19.8 IRREGULAR BOWEL HABITS: ICD-10-CM

## 2023-02-16 DIAGNOSIS — F32.A DEPRESSION, UNSPECIFIED DEPRESSION TYPE: ICD-10-CM

## 2023-02-16 DIAGNOSIS — Z78.9 NURSING HOME RESIDENT: Primary | ICD-10-CM

## 2023-02-16 DIAGNOSIS — I10 PRIMARY HYPERTENSION: ICD-10-CM

## 2023-02-16 DIAGNOSIS — K29.70 GASTRITIS, PRESENCE OF BLEEDING UNSPECIFIED, UNSPECIFIED CHRONICITY, UNSPECIFIED GASTRITIS TYPE: ICD-10-CM

## 2023-02-16 DIAGNOSIS — F44.9 DISSOCIATIVE DISORDER: ICD-10-CM

## 2023-02-16 DIAGNOSIS — G20.C PARKINSONISM, UNSPECIFIED PARKINSONISM TYPE (H): ICD-10-CM

## 2023-02-16 DIAGNOSIS — F22 DELUSIONAL DISORDER, SOMATIC TYPE (H): ICD-10-CM

## 2023-02-16 PROCEDURE — 99306 1ST NF CARE HIGH MDM 50: CPT | Mod: GC | Performed by: STUDENT IN AN ORGANIZED HEALTH CARE EDUCATION/TRAINING PROGRAM

## 2023-02-16 NOTE — PROGRESS NOTES
Subjective: Ana Gates is a 77 year old who is seen at home for follow up visit today.  Seen at [facility] [place] [address] .    Also present at visit include RN.  Phone: [phone]  Acute concerns today include: did have recent GI illness, reports BMs back to normal. Per RNs, she does some confused and unable to articulate thoughts at times. She is getting ECT weekly now after recent discharge from inpatient psychiatry unit at [facility]. RN staff reports she has lost 2 lb since being admitted to the nursing home 1 week ago.   She grew up in [facility] [place] [facility] and lived at [facility] before moving in to this nursing home.      Chronic and Past Medical Problems include:  Patient Active Problem List   Diagnosis     Dysthymia     Osteopenia     Psychosis (H)     PTSD (post-traumatic stress disorder)     Dissociative disorder     Somatization disorder     History of fracture of right hip     Depression     Anxiety     Conversion disorder     Gastritis     Eating disorder     Hypertension     Hx of breast cancer     Constipation     Insomnia     Palsy of axillary nerve     Parkinsonism (H)     Delusional disorder, somatic type (H)     Cataracts, bilateral     RLS (restless legs syndrome)     Irregular bowel habits     Family History     Problem (# of Occurrences) Relation (Name,Age of Onset)    Kidney Disease (1) Mother    Breast Cancer (1) Mother: 40's    Leukemia (1) Mother    No Known Problems (3) Sister, Daughter, Daughter        Social History:   Current activities:  Art activities, photography  Support services:  Per nursing home  Currently living family/friends:  daughters  PMHX/PSHX/MEDS/ALLERGIES/SHX/FHX reviewed and updated in Epic.   MEDICATION LIST:  Current Outpatient Medications   Medication     acetaminophen (TYLENOL) 500 MG tablet     aluminum-magnesium hydroxide-simethicone (MAALOX ADVANCED) 200-200-20 mg/5 mL Susp     bisacodyl (DULCOLAX) 10 mg suppository      BROMFENAC SODIUM (BROMFENAC OPHT)     calcium, as carbonate, (TUMS) 200 mg calcium (500 mg) chewable tablet     carbidopa-levodopa (SINEMET)  mg per tablet     dextromethorphan-guaiFENesin (ROBITUSSIN-DM)  mg/5 mL liquid     divalproex (DEPAKOTE) 500 MG 24 hr tablet     fluvoxaMINE (LUVOX) 50 MG tablet     furosemide (LASIX) 20 MG tablet     hydrocortisone 2.5 % cream     lipase-protease-amylase (CREON) 6,000-19,000 -30,000 unit CpDR capsule     lisinopril (PRINIVIL,ZESTRIL) 5 MG tablet     loperamide (IMODIUM A-D) 2 mg tablet     melatonin 3 mg Tab tablet     minoxidil (ROGAINE) 2 % external solution     moxifloxacin (VIGAMOX) 0.5 % ophthalmic solution     MULTIVITAMIN WITH FOLIC ACID ORAL     omeprazole (PRILOSEC) 20 MG capsule     polyvinyl alcohol (LIQUIFILM TEARS) 1.4 % ophthalmic solution     potassium chloride (KLOR-CON) 10 MEQ CR tablet     PREDNISOLONE ACETATE OPHT     QUEtiapine (SEROQUEL) 50 MG tablet     rOPINIRole (REQUIP) 1 MG tablet     senna-docusate (SENNOSIDES-DOCUSATE SODIUM) 8.6-50 mg tablet     traZODone (DESYREL) 100 MG tablet     traZODone (DESYREL) 50 MG tablet     zaleplon (SONATA) 10 MG capsule     No current facility-administered medications for this visit.     Medications are managed by:  SELF, FAMILY, HOME NURSE  Patient uses a pill box to manage their medications:  No  Patient has questions, concerns, or potential side effects/interactions from their medications:  No  GERIATRIC ROS:    Do you have difficulty getting around, watching TV or reading because of poor eyesight? No   Can you hear normal conversational voice? Yes    Do you use hearing aides? No   Do you have problems with your memory? Yes   Do you often feel sad or depressed? Yes   Have you unintentionally lost weight in the last 6 months? No   Do you have trouble with control of your bladder? No   Do you have trouble with control of your bowels? No   Have you had any falls in the past year? No       GENERAL ROS:    General: No unexplained weight gain or loss; adequate sleep pattern.  Resp: No worsening shortness of breath, cough or hemoptysis.   GI: No worsening constipation, no diarrhea, no nausea or vomiting  : Voiding independently with no significant incontinence   Skin: No areas of new skin breakdown or worrisome rashes  Extremities:  No pain, extremity weakness or balance troubles    Objective: See RN note for most recent vitals  Gen: Well nourished and in NAD   HEENT: Head is atraumatic, decent dentition  CV: RRR - 2/6 systolic murmur at LSB, no rubs, or gallups,   Pulm: CTAB, no wheezes/rales/rhonchi, good air entry   ABD: soft, nontender, BS intact  Extrem: no cyanosis, edema or clubbing   Psych: Euthymic  Neuro: Pt is able to ambulate independently    Preventative Screening:   Vaccinations reviewed and up to date except for Prevnar and zoster.   Get up and Go test:  Not completed.  Additional Recommended Screening: None    POA: Daughter   Phone number: will need to get at future visit  Code status: DNR/DNI  Healthcare Directive, Living Will, POLST has been completed:  POLST completed 2/16/23     Assessment/ Plan:  1. Nursing home resident  New admission to nursing home last week. POLST completed today. Feels like she is adjusting well overall, RN staff concerned she does not eat much and -2lb since admission last week, though she did also fall ill with GI illness circulating through the nursing home. No concerns with swallowing difficulties per RN staff.  -will be seen every 30 days for first 3 months during adjustment period   -regular diet    2. Delusional disorder, somatic type (H)  3. Dissociative disorder  4. Conversion disorder  5. Depression, unspecified depression type  6. Eating disorder, unspecified type  Recently discharged from long hospital stay at [facility] inpatient psych unit, directly admitted to nursing home from [facility].   -Getting ECT weekly per psychiatry  -Following closely with  psychiatry  -continue current medication regimen per psychiatry  -monitor weight closely    7. Primary hypertension  -continue current regimen    8. Gastritis, presence of bleeding unspecified, unspecified chronicity, unspecified gastritis type  9. Irregular bowel habits  Feels bowel patterns back to her baseline after having GI illness.   -continue current bowel regimen    10. Parkinsonism, unspecified Parkinsonism type (H)  -continue sinemet    RECOMMENDED FOLLOW UP:  1 month    Total of 50 minutes was spent in face to face contact with patient with > 50% in counseling and coordination of care.  Options for treatment and/or follow-up care were reviewed with the patient. Ana Gates was engaged and actively involved in the decision making process. She verbalized understanding of the options discussed and was satisfied with the final plan.      Benita Behm, MD PGY3  [facility]  02/16/23    I precepted today with Dr. Eubanks.

## 2023-02-17 NOTE — PROGRESS NOTES
Preceptor Attestation:    I discussed the patient with the resident and evaluated the patient in person. I have verified the content of the note, which accurately reflects my assessment of the patient and the plan of care.   Supervising Physician:  Winston Eubanks MD.

## 2023-02-23 RX ORDER — LOSARTAN POTASSIUM 25 MG/1
25 TABLET ORAL DAILY
Qty: 90 TABLET | COMMUNITY
Start: 2023-02-23 | End: 2023-10-10

## 2023-02-23 RX ORDER — ALBUTEROL SULFATE 90 UG/1
2 AEROSOL, METERED RESPIRATORY (INHALATION) EVERY 4 HOURS PRN
Qty: 18 G | COMMUNITY
Start: 2023-02-23

## 2023-02-23 RX ORDER — RAMELTEON 8 MG/1
8 TABLET ORAL AT BEDTIME
COMMUNITY
Start: 2023-02-23 | End: 2023-10-10

## 2023-02-23 RX ORDER — METOPROLOL SUCCINATE 25 MG/1
25 TABLET, EXTENDED RELEASE ORAL DAILY
Qty: 90 TABLET | COMMUNITY
Start: 2023-02-23

## 2023-02-23 RX ORDER — RISPERIDONE 2 MG/1
3 TABLET ORAL AT BEDTIME
COMMUNITY
Start: 2023-02-23

## 2023-02-23 RX ORDER — ESCITALOPRAM OXALATE 20 MG/1
20 TABLET ORAL DAILY
COMMUNITY
Start: 2023-02-23

## 2023-02-23 RX ORDER — ECHINACEA PURPUREA EXTRACT 125 MG
2 TABLET ORAL 2 TIMES DAILY
COMMUNITY
Start: 2023-02-23

## 2023-02-23 RX ORDER — FAMOTIDINE 20 MG/1
20 TABLET, FILM COATED ORAL DAILY
COMMUNITY
Start: 2023-02-23

## 2023-02-23 NOTE — PROGRESS NOTES
Medications reconciled to medication list from [facility].  Kole GarciaD

## 2023-03-10 NOTE — PROGRESS NOTES
Ana Gates 1945   wt 136.5   /88   P 70   R 16   T 97.7   O2 96% RA   Despite being a picky eater she has managed to gain a couple of pounds.   She has asked why she doesn't take Lasix anymore because it would help with the bags under her eyes.   I think when she first went to the hospital she was hypotensive because she didn't have adequate intake and so they d/c'd her Lasix at that time.     Reported by Mat/  ARACELIS

## 2023-03-14 ENCOUNTER — NURSING HOME VISIT (OUTPATIENT)
Dept: FAMILY MEDICINE | Facility: CLINIC | Age: 78
End: 2023-03-14
Payer: MEDICARE

## 2023-03-14 DIAGNOSIS — Z78.9 NURSING HOME RESIDENT: Primary | ICD-10-CM

## 2023-03-14 PROCEDURE — 99348 HOME/RES VST EST LOW MDM 30: CPT | Mod: GC

## 2023-03-14 NOTE — PROGRESS NOTES
Subjective: Ana Gates is a 78 year old who is seen at home for follow up visit today.  Seen at [facility] [place]   [address]  [facility] [address] .    Also present at visit include None, family member.  Phone: NA  Also present at visit include None, (home nurse, PCA, PT).  Phone: NA  Acute concerns today include: She is concerned that she is off her Lasix. Was recently admitted for Hypotension and stopped in the hospital. Has hx of CHF, previously seen Dr Amaya but not recently. Her weight is stable. LS clear. 's systolic. Would not want her to get hypotensive again, high risk of fall with age and parkinson disorder. Discussed with her we will monitor closely and can resume if needed but seems to be doing well off of it for right now.  - [place]    Chronic and Past Medical Problems include:  Patient Active Problem List   Diagnosis     Dysthymia     Osteopenia     Psychosis (H)     PTSD (post-traumatic stress disorder)     Dissociative disorder     Somatization disorder     History of fracture of right hip     Depression     Anxiety     Conversion disorder     Gastritis     Eating disorder     Hypertension     Hx of breast cancer     Constipation     Insomnia     Palsy of axillary nerve     Parkinsonism (H)     Delusional disorder, somatic type (H)     Cataracts, bilateral     RLS (restless legs syndrome)     Irregular bowel habits     Nursing home resident     Family History     Problem (# of Occurrences) Relation (Name,Age of Onset)    Kidney Disease (1) Mother    Breast Cancer (1) Mother: 40's    Leukemia (1) Mother    No Known Problems (3) Sister, Daughter, Daughter        Social History:   Current activities:  Lying in bed reading  Support services:  Lives in care facility   Currently living family/friends:  Lives in care facility   PMHX/PSHX/MEDS/ALLERGIES/SHX/FHX reviewed and updated in Epic.   MEDICATION LIST:  Current Outpatient Medications   Medication     albuterol (PROAIR  HFA/PROVENTIL HFA/VENTOLIN HFA) 108 (90 Base) MCG/ACT inhaler     aspirin-acetaminophen-caffeine (EXCEDRIN MIGRAINE) 250-250-65 MG tablet     escitalopram (LEXAPRO) 20 MG tablet     famotidine (PEPCID) 20 MG tablet     losartan (COZAAR) 25 MG tablet     metoprolol succinate ER (TOPROL XL) 25 MG 24 hr tablet     ramelteon (ROZEREM) 8 MG tablet     risperiDONE (RISPERDAL) 2 MG tablet     senna-docusate (SENNOSIDES-DOCUSATE SODIUM) 8.6-50 mg tablet     sodium chloride (OCEAN) 0.65 % nasal spray     umeclidinium (INCRUSE ELLIPTA) 62.5 MCG/ACT inhaler     No current facility-administered medications for this visit.     Medications are managed by:  SELF, FAMILY, HOME NURSE  Patient uses a pill box to manage their medications:  Yes / No  Patient has questions, concerns, or potential side effects/interactions from their medications:  Yes / No  GERIATRIC ROS:    Do you have difficulty getting around, watching TV or reading because of poor eyesight? Yes / No   Can you hear normal conversational voice? Yes / No   Do you use hearing aides? Yes / No   Do you have problems with your memory? Yes / No   Do you often feel sad or depressed? Yes / No   Have you unintentionally lost weight in the last 6 months? Yes / No   Do you have trouble with control of your bladder? Yes / No   Do you have trouble with control of your bowels? Yes / No   Have you had any falls in the past year? Yes / No   How many? NA    GENERAL ROS:   General: No unexplained weight gain or loss; adequate sleep pattern.  Resp: No worsening shortness of breath, cough or hemoptysis.   GI: No worsening constipation, no diarrhea, no nausea or vomiting  : Voiding independently with no significant incontinence   Skin: No areas of new skin breakdown or worrisome rashes  Extremities:  No pain, extremity weakness or balance troubles    Objective: There were no vitals taken for this visit.    Gen: Well nourished and in NAD   HEENT: Head is atraumatic, decent dentition  CV:  RRR - no murmurs, rubs, or gallups,   Pulm: CTAB, no wheezes/rales/rhonchi, good air entry   ABD: soft, nontender, BS intact  Extrem: no cyanosis, edema or clubbing   Psych: Euthymic  Neuro: Pt is able to ambulate independently    Preventative Screening:   Vaccinations reviewed and up to date   Get up and Go test:  Not applicable  Additional Recommended Screening: None    POA: See chart Phone number: See chart  Code status: See chart  Healthcare Directive, Living Will, POLST has been completed:  Yes     Assessment/ Plan:  There are no diagnoses linked to this encounter.     RECOMMENDED FOLLOW UP:  2 months.    Total of  minutes was spent in face to face contact with patient with > 50% in counseling and coordination of care.  Options for treatment and/or follow-up care were reviewed with the patient. Ana Gates was engaged and actively involved in the decision making process. She verbalized understanding of the options discussed and was satisfied with the final plan.      Jeannie Reyes MD

## 2023-04-05 NOTE — PROGRESS NOTES
Ana Gates 1945 30 day appt   wt 138   /88   P 58   R 16   T 97.5   O2 97% RA  ECT is now every other week    Reported by Mat/  ARACELIS

## 2023-04-06 ENCOUNTER — NURSING HOME VISIT (OUTPATIENT)
Dept: FAMILY MEDICINE | Facility: CLINIC | Age: 78
End: 2023-04-06
Payer: MEDICARE

## 2023-04-06 DIAGNOSIS — H53.8 BLURRED VISION: Primary | ICD-10-CM

## 2023-04-06 PROCEDURE — 99308 SBSQ NF CARE LOW MDM 20: CPT | Mod: GC

## 2023-04-06 NOTE — PROGRESS NOTES
Subjective: Ana Gates is a 78 year old who is seen at home for follow up visit today.  Seen at [facility] AT [place]   [address]  [facility] [address] .    Also present at visit include None, family member.  Phone: NA  Also present at visit include None, (home nurse, PCA, PT).  Phone: NA  Ana reports some blurry vision of the right eye. She wears cheaters but had prescription glasses before. Has not see eye doctor over a year.     Chronic and Past Medical Problems include:  Patient Active Problem List   Diagnosis     Dysthymia     Osteopenia     Psychosis (H)     PTSD (post-traumatic stress disorder)     Dissociative disorder     Somatization disorder     History of fracture of right hip     Depression     Anxiety     Conversion disorder     Gastritis     Eating disorder     Hypertension     Hx of breast cancer     Constipation     Insomnia     Palsy of axillary nerve     Parkinsonism (H)     Delusional disorder, somatic type (H)     Cataracts, bilateral     RLS (restless legs syndrome)     Irregular bowel habits     Nursing home resident     Family History     Problem (# of Occurrences) Relation (Name,Age of Onset)    Kidney Disease (1) Mother    Breast Cancer (1) Mother: 40's    Leukemia (1) Mother    No Known Problems (3) Sister, Daughter, Daughter        Social History:   Current activities:  Lying in bed reading  Support services:  Lives in care facility   Currently living family/friends:  Lives in care facility   PMHX/PSHX/MEDS/ALLERGIES/SHX/FHX reviewed and updated in Epic.   MEDICATION LIST:  Current Outpatient Medications   Medication     albuterol (PROAIR HFA/PROVENTIL HFA/VENTOLIN HFA) 108 (90 Base) MCG/ACT inhaler     aspirin-acetaminophen-caffeine (EXCEDRIN MIGRAINE) 250-250-65 MG tablet     escitalopram (LEXAPRO) 20 MG tablet     famotidine (PEPCID) 20 MG tablet     losartan (COZAAR) 25 MG tablet     metoprolol succinate ER (TOPROL XL) 25 MG 24 hr tablet     ramelteon (ROZEREM) 8 MG tablet      risperiDONE (RISPERDAL) 2 MG tablet     senna-docusate (SENNOSIDES-DOCUSATE SODIUM) 8.6-50 mg tablet     sodium chloride (OCEAN) 0.65 % nasal spray     umeclidinium (INCRUSE ELLIPTA) 62.5 MCG/ACT inhaler     No current facility-administered medications for this visit.     Medications are managed by:  SELF, FAMILY, HOME NURSE  Patient uses a pill box to manage their medications:  Yes / No  Patient has questions, concerns, or potential side effects/interactions from their medications:  Yes / No  GERIATRIC ROS:    Do you have difficulty getting around, watching TV or reading because of poor eyesight? Yes / No   Can you hear normal conversational voice? Yes / No   Do you use hearing aides? Yes / No   Do you have problems with your memory? Yes / No   Do you often feel sad or depressed? Yes / No   Have you unintentionally lost weight in the last 6 months? Yes / No   Do you have trouble with control of your bladder? Yes / No   Do you have trouble with control of your bowels? Yes / No   Have you had any falls in the past year? Yes / No   How many? NA    GENERAL ROS:   General: No unexplained weight gain or loss; adequate sleep pattern.  Resp: No worsening shortness of breath, cough or hemoptysis.   GI: No worsening constipation, no diarrhea, no nausea or vomiting  : Voiding independently with no significant incontinence   Skin: No areas of new skin breakdown or worrisome rashes  Extremities:  No pain, extremity weakness or balance troubles    Objective: There were no vitals taken for this visit.    Gen: Well nourished and in NAD   HEENT: Head is atraumatic, decent dentition  CV: RRR - no murmurs, rubs, or gallups,   Pulm: CTAB, no wheezes/rales/rhonchi, good air entry   ABD: soft, nontender, BS intact  Extrem: no cyanosis, edema or clubbing   Psych: Euthymic  Neuro: Pt is able to ambulate independently    Preventative Screening:   Vaccinations reviewed and up to date   Get up and Go test:  Not applicable  Additional  Recommended Screening: None    POA: See chart Phone number: See chart  Code status: See chart  Healthcare Directive, Living Will, POLST has been completed:  Yes     Assessment/ Plan:  1. Blurred vision  -opthalmology referral placed      RECOMMENDED FOLLOW UP:  1 months.    Total of  minutes was spent in face to face contact with patient with > 50% in counseling and coordination of care.  Options for treatment and/or follow-up care were reviewed with the patient. Ana Yajaira was engaged and actively involved in the decision making process. She verbalized understanding of the options discussed and was satisfied with the final plan.    Staffed with MD Thee Tomlin DO

## 2023-05-03 NOTE — PROGRESS NOTES
Ana Gates 1945 30-day appt   wt 134   /88   P 59   R 16   T 97.5   O2 98% RA  Obsessing over her bowels. She wanted Senna to be prn, then stated it had been several days since having  BM so then we did a trial of senna-s every 3 days and after receiving it one time reports she has bm leakage every time she wipes and now today reports hemorrhoids.     Reported by Mat/  ARACELIS

## 2023-05-04 ENCOUNTER — NURSING HOME VISIT (OUTPATIENT)
Dept: FAMILY MEDICINE | Facility: CLINIC | Age: 78
End: 2023-05-04
Payer: MEDICARE

## 2023-05-04 DIAGNOSIS — R19.8 IRREGULAR BOWEL HABITS: ICD-10-CM

## 2023-05-04 DIAGNOSIS — K59.00 CONSTIPATION, UNSPECIFIED CONSTIPATION TYPE: ICD-10-CM

## 2023-05-04 DIAGNOSIS — Z78.9 NURSING HOME RESIDENT: Primary | ICD-10-CM

## 2023-05-04 PROCEDURE — 99308 SBSQ NF CARE LOW MDM 20: CPT | Mod: GC

## 2023-05-04 NOTE — PROGRESS NOTES
Subjective: Ana Gates is a 78 year old who is seen at home for follow up visit today.  Seen at [facility] AT [place]   [address]  [facility] [address] .    Acute concerns today include: Obsessing over her bowels. She wanted Senna to be prn, then stated it had been several days since having  BM so staff did a trial of senna every 3 days and after receiving it one time reports she had bm leakage every time she wipes and now today reports hemorrhoids.     While speaking with this author she denied bowel symptoms.  Stated that she may be had some diarrhea last week and they found some hemorrhoids but these are not bothering her.  She otherwise stated to be feeling quite well with good mood and good sleep.    Chronic and Past Medical Problems include:  Patient Active Problem List   Diagnosis     Dysthymia     Osteopenia     Psychosis (H)     PTSD (post-traumatic stress disorder)     Dissociative disorder     Somatization disorder     History of fracture of right hip     Depression     Anxiety     Conversion disorder     Gastritis     Eating disorder     Hypertension     Hx of breast cancer     Constipation     Insomnia     Palsy of axillary nerve     Parkinsonism (H)     Delusional disorder, somatic type (H)     Cataracts, bilateral     RLS (restless legs syndrome)     Irregular bowel habits     Nursing home resident     Family History     Problem (# of Occurrences) Relation (Name,Age of Onset)    Kidney Disease (1) Mother    Breast Cancer (1) Mother: 40's    Leukemia (1) Mother    No Known Problems (3) Sister, Daughter, Daughter        Social History:   Current activities: Playing bingo    PMHX/PSHX/MEDS/ALLERGIES/SHX/FHX reviewed and updated in Epic.   MEDICATION LIST:  Current Outpatient Medications   Medication     albuterol (PROAIR HFA/PROVENTIL HFA/VENTOLIN HFA) 108 (90 Base) MCG/ACT inhaler     aspirin-acetaminophen-caffeine (EXCEDRIN MIGRAINE) 250-250-65 MG tablet     escitalopram (LEXAPRO) 20 MG tablet      famotidine (PEPCID) 20 MG tablet     losartan (COZAAR) 25 MG tablet     metoprolol succinate ER (TOPROL XL) 25 MG 24 hr tablet     ramelteon (ROZEREM) 8 MG tablet     risperiDONE (RISPERDAL) 2 MG tablet     senna-docusate (SENNOSIDES-DOCUSATE SODIUM) 8.6-50 mg tablet     sodium chloride (OCEAN) 0.65 % nasal spray     umeclidinium (INCRUSE ELLIPTA) 62.5 MCG/ACT inhaler     No current facility-administered medications for this visit.     Medications are managed by: Nursing staff  Patient has questions, concerns, or potential side effects/interactions from their medications:  No  GERIATRIC ROS:     Do you often feel sad or depressed? No   Do you have trouble with control of your bladder? No   Do you have trouble with control of your bowels? No       GENERAL ROS:   General: Adequate sleep pattern.  Resp: No worsening shortness of breath  GI: See above mentioned GI concerns.  : Denies dysuria  Extremities:  No pain    Objective:    /88   P 59   R 16   T 97.5   O2 98% RA  Most recent weight:  134 lbs  Gen: Well nourished and in NAD   HEENT: Head is atraumatic  CV: RRR - Systolic murmur. No rubs, or gallups,   Pulm: CTAB, no wheezes/rales/rhonchi, good air entry   ABD: soft, nontender, BS intact  Extrem: no cyanosis, edema or clubbing   Psych: Euthymic  Neuro: Pt is able to ambulate independently    Preventative Screening:   Vaccinations reviewed in [facility] which shows that she is not up to date with Covid, flu, Heb B, or shingles.        Assessment/ Plan:  ASSESSMENT / PLAN:  (Z59.3) Nursing home resident  (primary encounter diagnosis)  Comment: Here for 30-day evaluation of nursing home resident.  Acute concerns are addressed below.  Patient otherwise stable on current regimen for her chronic conditions.    (K59.00) Constipation, unspecified constipation type  (R19.8) Irregular bowel habits  Comment: Patient has been reporting irregular bowel habits and the nursing staff have been adjusting her senna as needed.   Today patient denies any abdominal or GI concerns and exam was normal.  Recommend that she continue with the current regimen of senna as needed.  No further work-up at this time          RECOMMENDED FOLLOW UP:  2 months.        Dariel Hackett MD

## 2023-05-05 RX ORDER — ACETAMINOPHEN 650 MG/1
650 SUPPOSITORY RECTAL EVERY 4 HOURS PRN
COMMUNITY
Start: 2023-05-05

## 2023-05-05 RX ORDER — LOPERAMIDE HCL 1 MG/7.5ML
SUSPENSION ORAL
COMMUNITY
Start: 2023-05-05

## 2023-05-05 RX ORDER — SENNOSIDES 8.6 MG
650 CAPSULE ORAL EVERY 4 HOURS PRN
COMMUNITY
Start: 2023-05-05 | End: 2023-12-11

## 2023-05-05 RX ORDER — ALUMINA, MAGNESIA, AND SIMETHICONE 2400; 2400; 240 MG/30ML; MG/30ML; MG/30ML
15 SUSPENSION ORAL
COMMUNITY
Start: 2023-05-05

## 2023-05-05 RX ORDER — GUAIFENESIN/DEXTROMETHORPHAN 100-10MG/5
10 SYRUP ORAL EVERY 4 HOURS PRN
COMMUNITY
Start: 2023-05-05

## 2023-05-05 RX ORDER — CALCIUM CARBONATE 500 MG/1
2 TABLET, CHEWABLE ORAL
COMMUNITY
Start: 2023-05-05

## 2023-05-05 RX ORDER — NYSTATIN 100000 [USP'U]/G
POWDER TOPICAL 2 TIMES DAILY PRN
COMMUNITY
Start: 2023-05-05

## 2023-06-05 NOTE — PROGRESS NOTES
Ana Carmineleonaasim 1945 Is being seen for 30 day appt.   Wt 136   /82   P 66   R 16   T 97.0   O2 100%   Ana c/o BMs too soft with Senna Q3 days and is refusing senna, then c/o blood in her stools (but then stated maybe it wasn't blood).     Reported by Alicia Pack @ [place]  [address]  [place] [id number]/  BTRN

## 2023-06-05 NOTE — PROGRESS NOTES
Subjective: Ana Gates is a 78 year old who is seen at home for follow up visit today.  Seen at [facility] [place] AT [place]   [address]  [facility] [address] .       Acute concerns today include:    Ana c/o BMs too soft with Senna Q3 days and is refusing senna, then c/o blood in her stools (but then stated maybe it wasn't blood).     Patient states she had 2 BMs already today that were too soft. She does not like the Senna anymore. She denies any recent blood in her stool.     Chronic and Past Medical Problems include:  Patient Active Problem List   Diagnosis     Dysthymia     Osteopenia     Psychosis (H)     PTSD (post-traumatic stress disorder)     Dissociative disorder     Somatization disorder     History of fracture of right hip     Depression     Anxiety     Conversion disorder     Gastritis     Eating disorder     Hypertension     Hx of breast cancer     Constipation     Insomnia     Palsy of axillary nerve     Parkinsonism (H)     Delusional disorder, somatic type (H)     Cataracts, bilateral     RLS (restless legs syndrome)     Irregular bowel habits     Nursing home resident     Family History     Problem (# of Occurrences) Relation (Name,Age of Onset)    Kidney Disease (1) Mother    Breast Cancer (1) Mother: 40's    Leukemia (1) Mother    No Known Problems (3) Sister, Daughter, Daughter        Social History:   Current activities:  Sometimes walks  Currently living family/friends:  No recent visitors  PMHX/PSHX/MEDS/ALLERGIES/SHX/FHX reviewed and updated in Epic.   MEDICATION LIST:  Current Outpatient Medications   Medication     acetaminophen (TYLENOL) 650 MG CR tablet     acetaminophen (TYLENOL) 650 MG suppository     albuterol (PROAIR HFA/PROVENTIL HFA/VENTOLIN HFA) 108 (90 Base) MCG/ACT inhaler     alum & mag hydroxide-simethicone (MAALOX MAX) 400-400-40 MG/5ML SUSP suspension     aspirin-acetaminophen-caffeine (EXCEDRIN MIGRAINE) 250-250-65 MG tablet     calcium carbonate (TUMS) 500 MG chewable  tablet     escitalopram (LEXAPRO) 20 MG tablet     famotidine (PEPCID) 20 MG tablet     guaiFENesin-dextromethorphan (ROBITUSSIN DM) 100-10 MG/5ML syrup     loperamide 1 MG/7.5ML liquid     losartan (COZAAR) 25 MG tablet     metoprolol succinate ER (TOPROL XL) 25 MG 24 hr tablet     nystatin (MYCOSTATIN) 602476 UNIT/GM external powder     ramelteon (ROZEREM) 8 MG tablet     risperiDONE (RISPERDAL) 2 MG tablet     senna-docusate (SENNOSIDES-DOCUSATE SODIUM) 8.6-50 mg tablet     sodium chloride (OCEAN) 0.65 % nasal spray     umeclidinium (INCRUSE ELLIPTA) 62.5 MCG/ACT inhaler     No current facility-administered medications for this visit.     Medications are managed by:  Nursing home  Patient has questions, concerns, or potential side effects/interactions from their medications:   No except for Senna as above  GERIATRIC ROS:    Do you have difficulty getting around, watching TV or reading because of poor eyesight? No, uses cheaters for reading   Can you hear normal conversational voice? Yes  Do you use hearing aides?  No   Do you have problems with your memory? Yes from ECT   Do you often feel sad or depressed? Mood ok   Have you unintentionally lost weight in the last 6 months? No   Do you have trouble with control of your bladder? Trouble getting to the bathroom in time in the morning   Do you have trouble with control of your bowels?  No   Have you had any falls in the past year? No       Objective:  Most recent weight:  Wt 136   /82   P 66   R 16   T 97.0   O2 100%  Gen: Well nourished and in NAD   HEENT: Head is atraumatic, decent dentition  CV: RRR - no murmurs, rubs, or gallups,   Pulm: CTAB, no wheezes/rales/rhonchi, good air entry   ABD: soft, nontender, BS intact  Extrem: no cyanosis, edema or clubbing   Psych: Euthymic  Neuro: Pt is able to ambulate independently    Preventative Screening:   Vaccinations reviewed , due for pneumococcal.   Additional Recommended Screening: Has not had a DEXA       Assessment/ Plan:  1. Nursing home resident  2. Constipation, unspecified constipation type  3. Delusional disorder, somatic type (H)  4. Dissociative disorder  5. Conversion disorder  6. Depression, unspecified depression type  Ana Gates is a 78 year old woman with PMH most notable for delusional disorder, dissociative disorder, conversion disorder, and depression. Currently struggling with control of constipation. Patient dissatisfied with Senna, feels her stools are too soft. Agreeable to starting daily Metamucil. Due for pneumococcal vaccine. Has not had a DEXA scan done, would recommend discussing this with patient at next visit.       RECOMMENDED FOLLOW UP:  2 months.    Level of Service:  [id number]    Total of 30 minutes was spent in face to face contact with patient with > 50% in counseling and coordination of care.  Options for treatment and/or follow-up care were reviewed with the patient. Ana Gates was engaged and actively involved in the decision making process. She verbalized understanding of the options discussed and was satisfied with the final plan.      Bria Diaz MD

## 2023-06-06 ENCOUNTER — NURSING HOME VISIT (OUTPATIENT)
Dept: FAMILY MEDICINE | Facility: CLINIC | Age: 78
End: 2023-06-06
Payer: MEDICARE

## 2023-06-06 DIAGNOSIS — K59.00 CONSTIPATION, UNSPECIFIED CONSTIPATION TYPE: ICD-10-CM

## 2023-06-06 DIAGNOSIS — F22 DELUSIONAL DISORDER, SOMATIC TYPE (H): ICD-10-CM

## 2023-06-06 DIAGNOSIS — F44.9 DISSOCIATIVE DISORDER: ICD-10-CM

## 2023-06-06 DIAGNOSIS — F44.9 CONVERSION DISORDER: ICD-10-CM

## 2023-06-06 DIAGNOSIS — Z78.9 NURSING HOME RESIDENT: Primary | ICD-10-CM

## 2023-06-06 DIAGNOSIS — F32.A DEPRESSION, UNSPECIFIED DEPRESSION TYPE: ICD-10-CM

## 2023-06-06 PROCEDURE — 99308 SBSQ NF CARE LOW MDM 20: CPT | Mod: GC | Performed by: STUDENT IN AN ORGANIZED HEALTH CARE EDUCATION/TRAINING PROGRAM

## 2023-10-02 ENCOUNTER — TELEPHONE (OUTPATIENT)
Dept: FAMILY MEDICINE | Facility: CLINIC | Age: 78
End: 2023-10-02
Payer: MEDICARE

## 2023-10-02 NOTE — TELEPHONE ENCOUNTER
Pt scheduled to be seen for nursing home visit on 10/04/2023.     Per RN notes from Alicia Gonzalez RN at [facility] as follows:     Vitals: Please see pt reported vitals tab (Click blue hyperlink below to view vitals)  Wt: 131  BP:112/71  P:68  R:16  T:96.9  O2:96    Questions/Concerns: needs increased help with ADLs.    Notes: Resident returned after being hospitalized for over a month for increased psychosis and FTT. She is now scheduled for weekly ECT and is using a walker.      CYNDEE Morrison

## 2023-10-06 ENCOUNTER — TELEPHONE (OUTPATIENT)
Dept: FAMILY MEDICINE | Facility: CLINIC | Age: 78
End: 2023-10-06
Payer: MEDICARE

## 2023-10-06 NOTE — TELEPHONE ENCOUNTER
Per Alicia Gonzalez-Pt receiving weekly ECT which will likely continue. Slow response time and uses walker. Sometimes forgets to use walker and walks stooped over. Returned with an order for Lasix.     SHANTELLE Pride

## 2023-10-10 ENCOUNTER — NURSING HOME VISIT (OUTPATIENT)
Dept: FAMILY MEDICINE | Facility: CLINIC | Age: 78
End: 2023-10-10
Payer: MEDICARE

## 2023-10-10 DIAGNOSIS — I10 PRIMARY HYPERTENSION: ICD-10-CM

## 2023-10-10 DIAGNOSIS — F43.10 PTSD (POST-TRAUMATIC STRESS DISORDER): ICD-10-CM

## 2023-10-10 DIAGNOSIS — G20.C PARKINSONISM, UNSPECIFIED PARKINSONISM TYPE (H): Primary | ICD-10-CM

## 2023-10-10 DIAGNOSIS — K59.00 CONSTIPATION, UNSPECIFIED CONSTIPATION TYPE: ICD-10-CM

## 2023-10-10 DIAGNOSIS — Z78.9 NURSING HOME RESIDENT: ICD-10-CM

## 2023-10-10 DIAGNOSIS — F44.9 DISSOCIATIVE DISORDER: ICD-10-CM

## 2023-10-10 PROCEDURE — 99308 SBSQ NF CARE LOW MDM 20: CPT | Mod: GC

## 2023-10-10 RX ORDER — MIRTAZAPINE 15 MG/1
15 TABLET, FILM COATED ORAL AT BEDTIME
COMMUNITY
Start: 2023-09-21

## 2023-10-10 RX ORDER — LANOLIN ALCOHOL/MO/W.PET/CERES
6 CREAM (GRAM) TOPICAL AT BEDTIME
COMMUNITY
Start: 2023-09-21

## 2023-10-10 RX ORDER — FUROSEMIDE 40 MG
40 TABLET ORAL DAILY
COMMUNITY
Start: 2023-09-22

## 2023-10-10 NOTE — PROGRESS NOTES
Subjective: Ana Gates is a 78 year old who is seen at home for follow up visit today.  Seen at [facility] ([place] [place] AT [place]   [address]  [facility] [address] .    Acute concerns today include: None. Reports feeling well.  Chronic and Past Medical Problems include:  Patient Active Problem List   Diagnosis    Dysthymia    Osteopenia    Psychosis (H)    PTSD (post-traumatic stress disorder)    Dissociative disorder    Somatization disorder    History of fracture of right hip    Depression    Anxiety    Conversion disorder    Gastritis    Eating disorder    Hypertension    Hx of breast cancer    Constipation    Insomnia    Palsy of axillary nerve    Parkinsonism    Delusional disorder, somatic type (H)    Cataracts, bilateral    RLS (restless legs syndrome)    Irregular bowel habits    Nursing home resident     Family History       Problem (# of Occurrences) Relation (Name,Age of Onset)    Kidney Disease (1) Mother    Breast Cancer (1) Mother: 40's    Leukemia (1) Mother    No Known Problems (3) Sister, Daughter, Daughter          Social History:   Current activities:  Walks.   Currently living family/friends:  No  PMHX/PSHX/MEDS/ALLERGIES/SHX/FHX reviewed and updated in Epic.   MEDICATION LIST:  Current Outpatient Medications   Medication    furosemide (LASIX) 40 MG tablet    melatonin 3 MG tablet    mirtazapine (REMERON) 15 MG tablet    acetaminophen (TYLENOL) 650 MG CR tablet    acetaminophen (TYLENOL) 650 MG suppository    albuterol (PROAIR HFA/PROVENTIL HFA/VENTOLIN HFA) 108 (90 Base) MCG/ACT inhaler    alum & mag hydroxide-simethicone (MAALOX MAX) 400-400-40 MG/5ML SUSP suspension    aspirin-acetaminophen-caffeine (EXCEDRIN MIGRAINE) 250-250-65 MG tablet    calcium carbonate (TUMS) 500 MG chewable tablet    escitalopram (LEXAPRO) 20 MG tablet    famotidine (PEPCID) 20 MG tablet    guaiFENesin-dextromethorphan (ROBITUSSIN DM) 100-10 MG/5ML syrup    loperamide 1 MG/7.5ML liquid    metoprolol succinate ER (TOPROL  XL) 25 MG 24 hr tablet    nystatin (MYCOSTATIN) 355691 UNIT/GM external powder    psyllium (METAMUCIL/KONSYL) 58.6 % powder    risperiDONE (RISPERDAL) 2 MG tablet    senna-docusate (SENNOSIDES-DOCUSATE SODIUM) 8.6-50 mg tablet    sodium chloride (OCEAN) 0.65 % nasal spray    umeclidinium (INCRUSE ELLIPTA) 62.5 MCG/ACT inhaler     No current facility-administered medications for this visit.     Medications are managed by:  SELF, FAMILY, HOME NURSE  Patient uses a pill box to manage their medications:  Nursing home  Patient has questions, concerns, or potential side effects/interactions from their medications:  No  GERIATRIC ROS:    Do you have difficulty getting around, watching TV or reading because of poor eyesight? No  Can you hear normal conversational voice? Yes  Do you use hearing aides?  No   Do you have problems with your memory? Yes from ECT   Do you often feel sad or depressed? No  Have you unintentionally lost weight in the last 6 months? No   Do you have trouble with control of your bladder? No  Do you have trouble with control of your bowels?  No   Have you had any falls in the past year? No       GENERAL ROS:   General: No unexplained weight gain or loss; adequate sleep pattern.  Resp: No worsening shortness of breath, cough or hemoptysis.   GI: No worsening constipation, no diarrhea, no nausea or vomiting  : Voiding independently with no significant incontinence   Skin: No areas of new skin breakdown or worrisome rashes  Extremities:  No pain, extremity weakness or balance troubles    Objective: Wt 131 /81 P 76 R 16 T 98.6   Gen: Well nourished and in NAD   HEENT: Head is atraumatic, decent dentition  CV: RRR - no murmurs, rubs, or gallups,   Pulm: CTAB, no wheezes/rales/rhonchi, good air entry   ABD: soft, nontender, BS intact  Extrem: no cyanosis, edema or clubbing   Psych: Euthymic  Neuro: Pt is able to ambulate independently    Preventative Screening:   Vaccinations reviewed and up to date.  Due for pneumococcal and flu  Additional Recommended Screening: DEXA   Assessment/ Plan:  Diagnoses and all orders for this visit:    Nursing home resident  Parkinsonism, unspecified Parkinsonism type  Constipation, unspecified constipation type  Primary hypertension  Dissociative disorder  PTSD (post-traumatic stress disorder)  78 year old nursing home resident for follow up today. Overall doing well since being admitted over the summer and then readmission to nursing home. Recently started on lasix, so will repeat BMP today. Stools have improved. Will receive vaccinations in the next month. Yearly labs due next month. Has not had DEXA scan, discussed with patient who states she is unsure. Will discuss next visit.   -BMP today    RECOMMENDED FOLLOW UP:  2 months.    Total of 30 minutes was spent in face to face contact with patient with > 50% in counseling and coordination of care.  Options for treatment and/or follow-up care were reviewed with the patient. Ana Gates was engaged and actively involved in the decision making process. She verbalized understanding of the options discussed and was satisfied with the final plan.      Giovanni Orellana, DO

## 2023-11-07 ENCOUNTER — DOCUMENTATION ONLY (OUTPATIENT)
Dept: FAMILY MEDICINE | Facility: CLINIC | Age: 78
End: 2023-11-07
Payer: MEDICARE

## 2023-11-07 VITALS
TEMPERATURE: 97.1 F | RESPIRATION RATE: 16 BRPM | BODY MASS INDEX: 23.74 KG/M2 | WEIGHT: 134 LBS | SYSTOLIC BLOOD PRESSURE: 116 MMHG | HEART RATE: 68 BPM | DIASTOLIC BLOOD PRESSURE: 65 MMHG | OXYGEN SATURATION: 91 %

## 2023-11-07 NOTE — PROGRESS NOTES
"Nursing home visit coming up on 11/9.  30 day appt.   Most recent vitals signs per SHANTELLE Vargas added to the patients chart.     \"Could we have an order for home PT/OT to assess and treat for weakness and deconditioning?\"    SHANTELLE Pride  "

## 2023-11-08 NOTE — PROGRESS NOTES
Subjective: Ana Gates is a 78 year old who is seen at home for follow up visit today.  Seen at [facility] AT [place]   [address]  [facility] [address] .    Acute concerns today include: Increasing weakness and fatigue following ECT treatments.She is interested in PT to help with weakness.   Ana is somewhat dissatisfied with receiving ECT treatments.  However she is understanding that these treatments probably help overall with her mental health.  She is very interested in physical therapy as this may help to improve her weakness and provide her with more energy.  She denies shortness of breath and she denies any changes in her dietary intake.  She feels she has a good appetite.  He reports no additional concerns at this time.  Chronic and Past Medical Problems include:  Patient Active Problem List   Diagnosis    Dysthymia    Osteopenia    Psychosis (H)    PTSD (post-traumatic stress disorder)    Dissociative disorder    Somatization disorder    History of fracture of right hip    Depression    Anxiety    Conversion disorder    Gastritis    Eating disorder    Hypertension    Hx of breast cancer    Constipation    Insomnia    Palsy of axillary nerve    Parkinsonism    Delusional disorder, somatic type (H)    Cataracts, bilateral    RLS (restless legs syndrome)    Irregular bowel habits    Nursing home resident     Family History       Problem (# of Occurrences) Relation (Name,Age of Onset)    Kidney Disease (1) Mother    Breast Cancer (1) Mother: 40's    Leukemia (1) Mother    No Known Problems (3) Sister, Daughter, Daughter          Social History:   Support services:  RN/staff  PMHX/PSHX/MEDS/ALLERGIES/SHX/FHX reviewed and updated in Epic.   MEDICATION LIST:  Current Outpatient Medications   Medication    acetaminophen (TYLENOL) 650 MG CR tablet    acetaminophen (TYLENOL) 650 MG suppository    albuterol (PROAIR HFA/PROVENTIL HFA/VENTOLIN HFA) 108 (90 Base) MCG/ACT inhaler    alum & mag  hydroxide-simethicone (MAALOX MAX) 400-400-40 MG/5ML SUSP suspension    aspirin-acetaminophen-caffeine (EXCEDRIN MIGRAINE) 250-250-65 MG tablet    calcium carbonate (TUMS) 500 MG chewable tablet    escitalopram (LEXAPRO) 20 MG tablet    famotidine (PEPCID) 20 MG tablet    furosemide (LASIX) 40 MG tablet    guaiFENesin-dextromethorphan (ROBITUSSIN DM) 100-10 MG/5ML syrup    loperamide 1 MG/7.5ML liquid    melatonin 3 MG tablet    metoprolol succinate ER (TOPROL XL) 25 MG 24 hr tablet    mirtazapine (REMERON) 15 MG tablet    nystatin (MYCOSTATIN) 409959 UNIT/GM external powder    psyllium (METAMUCIL/KONSYL) 58.6 % powder    risperiDONE (RISPERDAL) 2 MG tablet    senna-docusate (SENNOSIDES-DOCUSATE SODIUM) 8.6-50 mg tablet    sodium chloride (OCEAN) 0.65 % nasal spray    umeclidinium (INCRUSE ELLIPTA) 62.5 MCG/ACT inhaler     No current facility-administered medications for this visit.     Medications are managed by:  RN  Patient uses a pill box to manage their medications:  Nursing Home  Patient has questions, concerns, or potential side effects/interactions from their medications:  No  GERIATRIC ROS:    Reports increasing fatigue following ECT treatments. She also feels weaker than she previously. Denies decreased appetite. Denies shortness of breath.   GENERAL ROS:   General: No unexplained weight gain or loss; adequate sleep pattern.  Resp: No worsening shortness of breath, cough or hemoptysis.   GI: No worsening constipation, no diarrhea, no nausea or vomiting  Skin: No areas of new skin breakdown or worrisome rashes  Extremities:  No pain, extremity weakness or balance troubles    Objective: wt 137 /60, P53 R16 T97.1 O2 100%     Gen: Well nourished and in NAD   HEENT: Head is atraumatic, decent dentition  CV: RRR - no murmurs, rubs, or gallups,   Pulm: CTAB, no wheezes/rales/rhonchi, good air entry   ABD: soft, non tender  Extrem: no cyanosis, edema or clubbing   Psych: Euthymic  Neuro: Pt is able to  ambulate independently    Preventative Screening:   Vaccinations reviewed and up to date. Due for RSV, zoster, and pneumococcal vaccination  Additional Recommended Screening: DEXA    Assessment/ Plan:  Nursing home resident  Parkinsonism, unspecified Parkinsonism type  -Discussed with patient that her weakness and fatigue is likely multifactorial but likely in part related to ECT treatments.  Recommend that we pursue physical therapy to see if this improves her overall function and energy.  Patient verbalized clear understanding and agreement to this treatment plan and had no further questions or concerns at this time.  - Physical Therapy Referral; Future    PTSD (post-traumatic stress disorder)  Dissociative disorder  -78 year old female was seen at [facility]. Doing well since admitted. Yearly labs this week. Patient has not had a DEXA screening. Would recommend further discussion with her at next visit. She was unsure if she wanted this testing at her last visit.      RECOMMENDED FOLLOW UP:  2 months.    Precepted with Dr. Weston Talbert,

## 2023-11-09 ENCOUNTER — NURSING HOME VISIT (OUTPATIENT)
Dept: FAMILY MEDICINE | Facility: CLINIC | Age: 78
End: 2023-11-09
Payer: MEDICARE

## 2023-11-09 DIAGNOSIS — F44.9 DISSOCIATIVE DISORDER: ICD-10-CM

## 2023-11-09 DIAGNOSIS — G20.C PARKINSONISM, UNSPECIFIED PARKINSONISM TYPE (H): ICD-10-CM

## 2023-11-09 DIAGNOSIS — F43.10 PTSD (POST-TRAUMATIC STRESS DISORDER): ICD-10-CM

## 2023-11-09 DIAGNOSIS — Z78.9 NURSING HOME RESIDENT: Primary | ICD-10-CM

## 2023-11-09 PROCEDURE — 99307 SBSQ NF CARE SF MDM 10: CPT | Mod: GC

## 2023-11-10 NOTE — PROGRESS NOTES
Preceptor Attestation:    I discussed the patient with the resident and evaluated the patient in person. I have verified the content of the note, which accurately reflects my assessment of the patient and the plan of care.   Supervising Physician:  Weston Mcdaniel MD.

## 2023-11-24 ENCOUNTER — MEDICAL CORRESPONDENCE (OUTPATIENT)
Dept: HEALTH INFORMATION MANAGEMENT | Facility: CLINIC | Age: 78
End: 2023-11-24
Payer: MEDICARE

## 2023-12-11 ENCOUNTER — TELEPHONE (OUTPATIENT)
Dept: FAMILY MEDICINE | Facility: CLINIC | Age: 78
End: 2023-12-11
Payer: MEDICARE

## 2023-12-11 ENCOUNTER — DOCUMENTATION ONLY (OUTPATIENT)
Dept: FAMILY MEDICINE | Facility: CLINIC | Age: 78
End: 2023-12-11
Payer: MEDICARE

## 2023-12-11 RX ORDER — ACETAMINOPHEN 500 MG
500-1000 TABLET ORAL EVERY 6 HOURS PRN
COMMUNITY

## 2023-12-11 NOTE — PROGRESS NOTES
Subjective: Ana Gates is a 78 year old who is seen at home for follow up visit today.  Seen at [facility] AT [place]   [address]  [facility] [address] .    Acute concerns today include: At last visit, wanted to try PT. However, needs a home PT order. ECT every Wednesday still.   RN reports the patient complained of some right arm pain, perhaps improved with Tylenol x1. There was a concern that this was caused by a fall. On my interview today, patient denies any pain. On further discussion with RN, patient has not seemed to have complained of pain for the last week or so.   She denies changes to her appetite, sleep. Denies pain. Is agreeable to home PT. No other concerns today.   Chronic and Past Medical Problems include:  Patient Active Problem List   Diagnosis    Dysthymia    Osteopenia    Psychosis (H)    PTSD (post-traumatic stress disorder)    Dissociative disorder    Somatization disorder    History of fracture of right hip    Depression    Anxiety    Conversion disorder    Gastritis    Eating disorder    Hypertension    Hx of breast cancer    Constipation    Insomnia    Palsy of axillary nerve    Parkinsonism    Delusional disorder, somatic type (H)    Cataracts, bilateral    RLS (restless legs syndrome)    Irregular bowel habits    Nursing home resident     Family History       Problem (# of Occurrences) Relation (Name,Age of Onset)    Kidney Disease (1) Mother    Breast Cancer (1) Mother: 40's    Leukemia (1) Mother    No Known Problems (3) Sister, Daughter, Daughter          Social History:   Support services:  Meds managed by staff  PMHX/PSHX/MEDS/ALLERGIES/SHX/FHX reviewed and updated in Epic.   MEDICATION LIST:  Current Outpatient Medications   Medication    acetaminophen (TYLENOL) 500 MG tablet    acetaminophen (TYLENOL) 650 MG suppository    albuterol (PROAIR HFA/PROVENTIL HFA/VENTOLIN HFA) 108 (90 Base) MCG/ACT inhaler    alum & mag hydroxide-simethicone (MAALOX MAX) 400-400-40 MG/5ML SUSP  suspension    calcium carbonate (TUMS) 500 MG chewable tablet    escitalopram (LEXAPRO) 20 MG tablet    famotidine (PEPCID) 20 MG tablet    furosemide (LASIX) 40 MG tablet    guaiFENesin-dextromethorphan (ROBITUSSIN DM) 100-10 MG/5ML syrup    loperamide 1 MG/7.5ML liquid    melatonin 3 MG tablet    metoprolol succinate ER (TOPROL XL) 25 MG 24 hr tablet    mirtazapine (REMERON) 15 MG tablet    nystatin (MYCOSTATIN) 712064 UNIT/GM external powder    risperiDONE (RISPERDAL) 2 MG tablet    senna-docusate (SENNOSIDES-DOCUSATE SODIUM) 8.6-50 mg tablet    sodium chloride (OCEAN) 0.65 % nasal spray    umeclidinium (INCRUSE ELLIPTA) 62.5 MCG/ACT inhaler     No current facility-administered medications for this visit.     Medications are managed by:  HOME NURSE  Patient has questions, concerns, or potential side effects/interactions from their medications:  No  GERIATRIC ROS:    Can you hear normal conversational voice? Yes  Do you often feel sad or depressed?  No   Have you had any falls in the past year? Yes How many? Unsure per patient and RN  GENERAL ROS:   General: No unexplained weight gain or loss; adequate sleep pattern.  Resp: No worsening shortness of breath, cough or hemoptysis.   GI: No worsening constipation, no diarrhea, no nausea or vomiting  : Voiding independently with no significant incontinence   Skin: No areas of new skin breakdown or worrisome rashes  Extremities:  No pain, extremity weakness or balance troubles  Objective: There were no vitals taken for this visit.   Most recent weight:  134 lb  Gen: Well nourished and in NAD   HEENT: Head is atraumatic, decent dentition  CV: RRR - no murmurs, rubs, or gallups,   Pulm: CTAB, no wheezes/rales/rhonchi, good air entry   ABD: soft, nontender, BS intact  Extrem: no cyanosis, edema or clubbing   Psych: Euthymic  Neuro: Pt is able to ambulate independently, moves extremities symmetrically   Preventative Screening:   Vaccinations reviewed- due for RSV,  "pneumococcal, and zoster when able  Additional Recommended Screening: DEXA    Assessment/ Plan:  Nursing home resident  Pain of right upper arm  6. Fall, initial encounter  Per staff, has been found down (\"sitting\") a few times, concern for falls. Denies right arm pain today, but had previously endorsed this.  - Tylenol PRN for pain, update provider if pt is complaining of pain or decreased ROM  - PT eval and treat (HOME PT)    Parkinsonism, unspecified Parkinsonism type  PTSD (post-traumatic stress disorder)  Dissociative disorder  - Follow up psychiatry     RECOMMENDED FOLLOW UP:  2 months.    Noy Agee DO"

## 2023-12-11 NOTE — PROGRESS NOTES
Med rec completed with [facility] medication list as of 10/9    Leticia Knapp PharmRogelioD.  Medication Therapy Management Pharmacy Resident

## 2023-12-12 ENCOUNTER — NURSING HOME VISIT (OUTPATIENT)
Dept: FAMILY MEDICINE | Facility: CLINIC | Age: 78
End: 2023-12-12
Payer: MEDICARE
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
## 2023-12-12 DIAGNOSIS — G20.C PARKINSONISM, UNSPECIFIED PARKINSONISM TYPE (H): ICD-10-CM

## 2023-12-12 DIAGNOSIS — F43.10 PTSD (POST-TRAUMATIC STRESS DISORDER): ICD-10-CM

## 2023-12-12 DIAGNOSIS — Z78.9 NURSING HOME RESIDENT: Primary | ICD-10-CM

## 2023-12-12 DIAGNOSIS — M79.621 PAIN OF RIGHT UPPER ARM: ICD-10-CM

## 2023-12-12 DIAGNOSIS — F44.9 DISSOCIATIVE DISORDER: ICD-10-CM

## 2023-12-12 PROCEDURE — 99308 SBSQ NF CARE LOW MDM 20: CPT | Mod: GC

## 2023-12-29 ENCOUNTER — DOCUMENTATION ONLY (OUTPATIENT)
Dept: FAMILY MEDICINE | Facility: CLINIC | Age: 78
End: 2023-12-29
Payer: MEDICARE

## 2023-12-30 NOTE — PROGRESS NOTES
Answering Service Pager  I received a page at 19:13 on 12/29/2023 regarding Ana Gates. She reported a fall with a head strike on a carpeted floor. Not on a blood thinner. Vitally stable 138/82, 97.1, P 76 98% RA. 20 RR.  Per RN Francie Choudhury - she is oriented to person, place, time, and situation. She hit her head on a carpeted floor on the left side of forehead. Small 1.5 cm laceration, noted per RN not-currently bleeding. She fell from standing height. Recommend frequent neuro checks per protocol. If patient becomes disoriented or or has cognitive deterioration, would recommend ED for further evaluation  Waldemar Talbert, DO

## 2024-01-03 ENCOUNTER — TELEPHONE (OUTPATIENT)
Dept: FAMILY MEDICINE | Facility: CLINIC | Age: 79
End: 2024-01-03
Payer: MEDICARE

## 2024-01-03 NOTE — TELEPHONE ENCOUNTER
[facility] has now said that they cannot come to our facility to do PT with Ana.     ELLA Butts

## 2024-01-04 ENCOUNTER — NURSING HOME VISIT (OUTPATIENT)
Dept: FAMILY MEDICINE | Facility: CLINIC | Age: 79
End: 2024-01-04
Payer: MEDICARE

## 2024-01-04 VITALS
HEART RATE: 78 BPM | TEMPERATURE: 96.9 F | SYSTOLIC BLOOD PRESSURE: 120 MMHG | DIASTOLIC BLOOD PRESSURE: 64 MMHG | BODY MASS INDEX: 23.91 KG/M2 | WEIGHT: 135 LBS | RESPIRATION RATE: 17 BRPM

## 2024-01-04 DIAGNOSIS — R41.3 MEMORY CHANGE: ICD-10-CM
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
## 2024-01-04 DIAGNOSIS — Z78.9 NURSING HOME RESIDENT: ICD-10-CM

## 2024-01-04 PROCEDURE — 99308 SBSQ NF CARE LOW MDM 20: CPT | Mod: GC

## 2024-01-04 NOTE — PROGRESS NOTES
"Subjective: Ana Gates is a 78 year old who is seen at [facility] for follow up visit today.    Additional information obtained from Nursing Staff, and includes:  Ana had a fall 12/29 during dinner and hit her head. Psychiatry stopped her ECT this week, and will resume next week. Patient has not received a non-contrast head CT since the fall.   Acute concerns today include: recent fall and patient has questions re: memory  Patient reports that she \"got her feet mixed up\" when rising from her seat at dinner 12/29 and hit head on the way down. No preceeding dizziness or lightheadedness, no LOC. She received a large bruise on her forehead where it hit, but reports no lingering headaches, vision changes, phono or photosensitivity. She did not go to the ED for head imaging after.   She also reports that her memory after ECT is a bit fuzzy, and is worried about it overall.  Chronic and Past Medical Problems include:  Patient Active Problem List   Diagnosis    Dysthymia    Osteopenia    Psychosis (H)    PTSD (post-traumatic stress disorder)    Dissociative disorder    Somatization disorder    History of fracture of right hip    Depression    Anxiety    Conversion disorder    Gastritis    Eating disorder    Hypertension    Hx of breast cancer    Constipation    Insomnia    Palsy of axillary nerve    Parkinsonism    Delusional disorder, somatic type (H)    Cataracts, bilateral    RLS (restless legs syndrome)    Irregular bowel habits    Nursing home resident     Family History       Problem (# of Occurrences) Relation (Name,Age of Onset)    Kidney Disease (1) Mother    Breast Cancer (1) Mother: 40's    Leukemia (1) Mother    No Known Problems (3) Sister, Daughter, Daughter          Social History:   Current activities:  Reading, walking with walker, interacting with other residents  Support services:  baseline  PMHX/PSHX/MEDS/ALLERGIES/SHX/FHX reviewed and updated in Epic.   MEDICATION LIST:  Current " Outpatient Medications   Medication    acetaminophen (TYLENOL) 500 MG tablet    acetaminophen (TYLENOL) 650 MG suppository    albuterol (PROAIR HFA/PROVENTIL HFA/VENTOLIN HFA) 108 (90 Base) MCG/ACT inhaler    alum & mag hydroxide-simethicone (MAALOX MAX) 400-400-40 MG/5ML SUSP suspension    calcium carbonate (TUMS) 500 MG chewable tablet    escitalopram (LEXAPRO) 20 MG tablet    famotidine (PEPCID) 20 MG tablet    furosemide (LASIX) 40 MG tablet    guaiFENesin-dextromethorphan (ROBITUSSIN DM) 100-10 MG/5ML syrup    loperamide 1 MG/7.5ML liquid    melatonin 3 MG tablet    metoprolol succinate ER (TOPROL XL) 25 MG 24 hr tablet    mirtazapine (REMERON) 15 MG tablet    nystatin (MYCOSTATIN) 713899 UNIT/GM external powder    risperiDONE (RISPERDAL) 2 MG tablet    senna-docusate (SENNOSIDES-DOCUSATE SODIUM) 8.6-50 mg tablet    sodium chloride (OCEAN) 0.65 % nasal spray    umeclidinium (INCRUSE ELLIPTA) 62.5 MCG/ACT inhaler     No current facility-administered medications for this visit.       GERIATRIC ROS:    Do you have difficulty getting around, watching TV or reading because of poor eyesight? No   Can you hear normal conversational voice? Yes  Do you use hearing aides? No   Do you have problems with your memory? Yes   Do you often feel sad or depressed? No   Have you unintentionally lost weight in the last 6 months? No   Do you have trouble with control of your bladder? No   Do you have trouble with control of your bowels?No   Have you had any falls in the past year? Yes    How many? 1    GENERAL ROS:   General: No unexplained weight gain or loss; adequate sleep pattern.  Resp: No worsening shortness of breath, cough or hemoptysis.   GI: No worsening constipation, no diarrhea, no nausea or vomiting  : Voiding independently with no significant incontinence   Skin: No areas of new skin breakdown or worrisome rashes  Extremities:  No pain, extremity weakness or balance troubles    Objective: /64   Pulse 78    Temp 96.9  F (36.1  C)   Resp 17   Wt 61.2 kg (135 lb)   BMI 23.91 kg/m     Most recent weight:  135lbs  Gen: Well nourished and in NAD   HEENT: 27mod3nu bruise on L temple with extensive yellow bruising of temple and upper jaw, decent dentition, moist mucous membranes  CV: RRR - blowing murmur I/IV throughout  Pulm: CTAB, no wheezes/rales/rhonchi, good air entry   ABD: soft, nontender, BS intact  Extrem: no cyanosis, edema or clubbing   Psych: Euthymic, slow speech generation, but keeps train of thought, MMSE: 3 word repeat all three, recall 2/3 after 3 minutes, 2 serial 7's, and normal clockface.   Neuro: Pt is able to ambulate independently with walker    Preventative Screening:   Vaccinations reviewed and up to date   Additional Recommended Screening: Psychiatry to decide if non-contrast head CT needed for fall 12/29.   Assessment/ Plan:  Ground-level fall  Nursing home resident  Patient had a mechanical fall 12/29 and hit left side of head on table. Some bruising after, but no changes in mood, behavior, or balance after. Patient is not on blood thinners. Psych opted to delay this week's ECT after fall, but will resume next week. Exam negative for concussion or CN palsies.   -patient may not require non-con head CT at this time, will defer to psychiatry if they recommend one  -monitor closely  Memory change  Patient reported memory concerns after ECT. Explained to patient normal amount of difficulty with memory encoding after ECT. MMSE showed minor memory issues, but overall good (see exam for more details).   -psych to recommend ECT schedule      RECOMMENDED FOLLOW UP:  2 months.    Total of 25 minutes was spent in face to face contact with patient with > 50% in counseling and coordination of care.  Options for treatment and/or follow-up care were reviewed with the patient. Ana Gates was engaged and actively involved in the decision making process. She verbalized understanding of the options discussed  and was satisfied with the final plan.      Alina Cardona MD  [facility]  PGY-3  [phone] (pager)

## 2024-01-05 NOTE — PROGRESS NOTES
Medications reconciled to medication list from [facility].  Renuka Wong, PharmD

## 2024-02-21 ENCOUNTER — TELEPHONE (OUTPATIENT)
Dept: FAMILY MEDICINE | Facility: CLINIC | Age: 79
End: 2024-02-21
Payer: MEDICARE

## 2024-02-21 DIAGNOSIS — M79.604 PAIN OF RIGHT LOWER EXTREMITY: ICD-10-CM

## 2024-02-21 DIAGNOSIS — M79.662 PAIN OF LEFT LOWER LEG: Primary | ICD-10-CM

## 2024-02-21 NOTE — TELEPHONE ENCOUNTER
"Spoke with Koki, nurse from [facility] about symptoms. For the last 5 days, pt has been verbalizing intermittent right, upper, outer leg/ thigh pain. There is no obvious injury, swelling, bruising or redness in the area. Pt denies having any falls or bumping that area. Today the pt reports to the staff that the pain is \"excruciating\". She does not want to leave her bed and is not eating. Nurse reports Tylenol was given an hour ago, but this is not helping. Koki is requesting an order for x-ray, They use [facility] fax number [phone]. Koki is also inquiring about better pain control options. Pt has allergy to ibuprofen.   SHANTELLE Hdez, BSN    "

## 2024-02-21 NOTE — TELEPHONE ENCOUNTER
New orders for rt femur and rt hip x-ray were faxed to [phone].    Called [facility] and gave info to Aixa.  She transferred nurse to Alicia HOLDEN to make sure nothing further was needed.  LM with the above and told her to call if anything further is needed./Vicki Rosenberg RN BSN

## 2024-02-21 NOTE — TELEPHONE ENCOUNTER
[facility] phone call message- general phone call:    Reason for call: nurse in home with patient need to talk to nurse.    Action desired: call back.    Return call needed: Yes    OK to leave a message on voice mail? Yes    Advised patient to response may take up to 2 business days: Yes    Primary language: English      needed? No    Call taken on February 21, 2024 at 1:12 PM by Jordy Rothman

## 2024-02-22 ENCOUNTER — MEDICAL CORRESPONDENCE (OUTPATIENT)
Dept: HEALTH INFORMATION MANAGEMENT | Facility: CLINIC | Age: 79
End: 2024-02-22
Payer: MEDICARE

## 2024-03-05 ENCOUNTER — TELEPHONE (OUTPATIENT)
Dept: FAMILY MEDICINE | Facility: CLINIC | Age: 79
End: 2024-03-05
Payer: MEDICARE

## 2024-03-05 NOTE — TELEPHONE ENCOUNTER
Per RN notes from Alicia Gonzalez RN at [facility] as follows:      Ana has had increased perseveration on certain things since ECT was decreased to every other week. Very obsessed about her depends and whether it is pulled up enough or bunched up etc. Continues to eat/drink ok, Dr. Ibrahim notified via progress notes on each ECT day about behaviors    ELLA Butts

## 2024-03-07 ENCOUNTER — NURSING HOME VISIT (OUTPATIENT)
Dept: FAMILY MEDICINE | Facility: CLINIC | Age: 79
End: 2024-03-07
Payer: MEDICARE

## 2024-03-07 DIAGNOSIS — G89.29 CHRONIC PAIN OF RIGHT KNEE: ICD-10-CM

## 2024-03-07 DIAGNOSIS — Z78.9 NURSING HOME RESIDENT: Primary | ICD-10-CM

## 2024-03-07 DIAGNOSIS — M25.561 CHRONIC PAIN OF RIGHT KNEE: ICD-10-CM

## 2024-03-07 PROCEDURE — 99308 SBSQ NF CARE LOW MDM 20: CPT | Mod: GC

## 2024-03-07 NOTE — PROGRESS NOTES
Subjective: Ana Gates is a 79 year old who is seen at home for follow up visit today.  Seen at [facility] AT [place]. She is being seen for 60 day appointment.   [address]  [facility] [address] .      Acute concerns today include:   Right knee pain intermittent, worse with bending. Worse over the last couple weeks. No injuries.   ECT yesterday. States she usually feels better 1-2 days after getting ECT so it hasn't effected her mood much yet this time but she expects to feel better tomorrow.  Per nursing home staff, she is having an increase in perseverating thoughts on certain things since ECT was decreased to every other week. Continues to eat and drink ok.     Chronic and Past Medical Problems include:  Patient Active Problem List   Diagnosis    Dysthymia    Osteopenia    Psychosis (H)    PTSD (post-traumatic stress disorder)    Dissociative disorder    Somatization disorder    History of fracture of right hip    Depression    Anxiety    Conversion disorder    Gastritis    Eating disorder    Hypertension    Hx of breast cancer    Constipation    Insomnia    Palsy of axillary nerve    Parkinsonism    Delusional disorder, somatic type (H)    Cataracts, bilateral    RLS (restless legs syndrome)    Irregular bowel habits    Nursing home resident     Family History       Problem (# of Occurrences) Relation (Name,Age of Onset)    Kidney Disease (1) Mother    Breast Cancer (1) Mother: 40's    Leukemia (1) Mother    No Known Problems (3) Sister, Daughter, Daughter          Social History:   Current activities:  reading, interacting with other residents  Support services:  no changes other than ECT changes as above  PMHX/PSHX/MEDS/ALLERGIES/SHX/FHX reviewed and updated in Epic.   MEDICATION LIST:  Current Outpatient Medications   Medication    diclofenac (VOLTAREN) 1 % topical gel    acetaminophen (TYLENOL) 500 MG tablet    acetaminophen (TYLENOL) 650 MG suppository    albuterol (PROAIR HFA/PROVENTIL  HFA/VENTOLIN HFA) 108 (90 Base) MCG/ACT inhaler    alum & mag hydroxide-simethicone (MAALOX MAX) 400-400-40 MG/5ML SUSP suspension    calcium carbonate (TUMS) 500 MG chewable tablet    escitalopram (LEXAPRO) 20 MG tablet    famotidine (PEPCID) 20 MG tablet    furosemide (LASIX) 40 MG tablet    guaiFENesin-dextromethorphan (ROBITUSSIN DM) 100-10 MG/5ML syrup    loperamide 1 MG/7.5ML liquid    melatonin 3 MG tablet    metoprolol succinate ER (TOPROL XL) 25 MG 24 hr tablet    mirtazapine (REMERON) 15 MG tablet    nystatin (MYCOSTATIN) 184376 UNIT/GM external powder    risperiDONE (RISPERDAL) 2 MG tablet    senna-docusate (SENNOSIDES-DOCUSATE SODIUM) 8.6-50 mg tablet    sodium chloride (OCEAN) 0.65 % nasal spray    umeclidinium (INCRUSE ELLIPTA) 62.5 MCG/ACT inhaler     No current facility-administered medications for this visit.     Medications are managed by:  SELF, FAMILY, HOME NURSE  Patient uses a pill box to manage their medications:  Nurses manage medications   Patient has questions, concerns, or potential side effects/interactions from their medications:  No concerns   GERIATRIC ROS:    Do you have difficulty getting around, watching TV or reading because of poor eyesight? No   Can you hear normal conversational voice? Yes  Do you use hearing aides? No   Do you have problems with your memory? Yes   Do you often feel sad or depressed? Sometimes - states ECT helps  Have you unintentionally lost weight in the last 6 months? No   Do you have trouble with control of your bladder? Yes / No   Do you have trouble with control of your bowels? Yes / No   Have you had any falls in the past year? Yes  How many? 1 on 12/29    GENERAL ROS:   General: No unexplained weight gain or loss; adequate sleep pattern.  Resp: No worsening shortness of breath or cough   GI: No worsening constipation, no diarrhea, no nausea or vomiting  : Voiding independently with no significant incontinence   Skin: No areas of new skin breakdown or  worrisome rashes  Extremities:  Right knee pain, no new extremity weakness     Objective: /54, P 85, RR 17, T97.7 O2 98% RA Most recent weight:  140.5 lbs  Gen: Well nourished and in NAD   HEENT: Head is atraumatic, moist mucous membranes  CV: RRR  Pulm: CTAB, no wheezes/rales/rhonchi, good air entry   ABD: soft, nontender, BS intact  Extrem: no cyanosis, edema or clubbing. Right knee with full ROM, non-tender, no swelling, crepitus present  Psych: Euthymic, slow speech  Neuro: Pt is able to ambulate independently with walker    Preventative Screening:   Vaccinations reviewed and up to date      Assessment/ Plan:  Diagnoses and all orders for this visit:    Nursing home resident  Continues on ECT per psychiatry though this has been decreased from weekly to every other week. Per nursing home staff, she has had increase in perseverating thoughts on certain things but is still eating and drinking well. Physiatrist Dr Singh is notified of ECT progress notes per nursing home staff.   - continue ECT per psychiatry     Chronic pain of right knee  Right knee pain intermittent, worse with bending. Worse over the last couple weeks. No injuries. Exam with full ROM, non-tender, no swelling, crepitus present. Likely osteoarthritis. Will trial voltaren gel   - voltaren gel to right knee 4x/daily as needed for pain        RECOMMENDED FOLLOW UP:  2 months.    Erica Jimenez MD PGY3  [facility]  03/07/24    I precepted today with Dr. Eubanks.

## 2024-05-11 DIAGNOSIS — Z87.81 HISTORY OF FRACTURE OF RIGHT HIP: Primary | ICD-10-CM

## 2024-05-13 RX ORDER — DICLOFENAC SODIUM 10 MG/G
GEL TOPICAL
Qty: 100 G | Status: SHIPPED | OUTPATIENT
Start: 2024-05-13

## 2024-05-20 ENCOUNTER — TELEPHONE (OUTPATIENT)
Dept: FAMILY MEDICINE | Facility: CLINIC | Age: 79
End: 2024-05-20
Payer: MEDICARE

## 2024-05-20 NOTE — TELEPHONE ENCOUNTER
Per RN notes from Alicia Gonzalez RN at [facility] as follows:       BARI Ana has been complaining of right leg pain using prn Tylenol and Diclofenac. Ibuprofen is on her allergy list.     ELLA Butts

## 2024-05-22 ENCOUNTER — NURSING HOME VISIT (OUTPATIENT)
Dept: FAMILY MEDICINE | Facility: CLINIC | Age: 79
End: 2024-05-22
Payer: MEDICARE

## 2024-05-22 VITALS
TEMPERATURE: 97 F | BODY MASS INDEX: 25.51 KG/M2 | SYSTOLIC BLOOD PRESSURE: 119 MMHG | WEIGHT: 144 LBS | HEART RATE: 72 BPM | DIASTOLIC BLOOD PRESSURE: 72 MMHG | RESPIRATION RATE: 16 BRPM

## 2024-05-22 DIAGNOSIS — M76.31 IT BAND SYNDROME, RIGHT: ICD-10-CM

## 2024-05-22 DIAGNOSIS — Z78.9 NURSING HOME RESIDENT: Primary | ICD-10-CM

## 2024-05-22 PROCEDURE — 99308 SBSQ NF CARE LOW MDM 20: CPT | Mod: GC

## 2024-05-22 RX ORDER — IBUPROFEN 400 MG/1
400 TABLET, FILM COATED ORAL EVERY 12 HOURS PRN
COMMUNITY
Start: 2024-05-22 | End: 2024-09-19

## 2024-05-22 NOTE — PROGRESS NOTES
Subjective: Ana Gates is a 79 year old who is seen at home for follow up visit today.  Seen at [facility] AT [place]   [address]  [facility] [address] .    Also present at visit include NA, family member.  Phone: NA  Also present at visit include NA, (home nurse, PCA, PT).  Phone: NA  Acute concerns today include: Right hip pain that radiates down her outer thigh to her knee.     Chronic and Past Medical Problems include:  Patient Active Problem List   Diagnosis    Dysthymia    Osteopenia    Psychosis (H)    PTSD (post-traumatic stress disorder)    Dissociative disorder    Somatization disorder    History of fracture of right hip    Depression    Anxiety    Conversion disorder    Gastritis    Eating disorder    Hypertension    Hx of breast cancer    Constipation    Insomnia    Palsy of axillary nerve    Parkinsonism (H)    Delusional disorder, somatic type (H)    Cataracts, bilateral    RLS (restless legs syndrome)    Irregular bowel habits    Nursing home resident     Family History       Problem (# of Occurrences) Relation (Name,Age of Onset)    Kidney Disease (1) Mother    Breast Cancer (1) Mother: 40's    Leukemia (1) Mother    No Known Problems (3) Sister, Daughter, Daughter          Social History:   Current activities:  Reading books, visiting with residents  Support services:  No change  Currently living family/friends:  NA  PMHX/PSHX/MEDS/ALLERGIES/SHX/FHX reviewed and updated in Epic.   MEDICATION LIST:  Current Outpatient Medications   Medication Sig Dispense Refill    ibuprofen (ADVIL/MOTRIN) 400 MG tablet Take 1 tablet (400 mg) by mouth every 12 hours as needed for moderate pain      acetaminophen (TYLENOL) 500 MG tablet Take 500-1,000 mg by mouth every 6 hours as needed for pain And 1000 mg at bedtime      acetaminophen (TYLENOL) 650 MG suppository Place 1 suppository (650 mg) rectally every 4 hours as needed for fever      albuterol (PROAIR HFA/PROVENTIL HFA/VENTOLIN HFA) 108 (90 Base)  MCG/ACT inhaler Inhale 2 puffs into the lungs every 4 hours as needed for shortness of breath 18 g     alum & mag hydroxide-simethicone (MAALOX MAX) 400-400-40 MG/5ML SUSP suspension Take 15 mLs by mouth every 3 hours as needed for indigestion      calcium carbonate (TUMS) 500 MG chewable tablet Take 2 tablets (1,000 mg) by mouth every 3 hours as needed for heartburn      escitalopram (LEXAPRO) 20 MG tablet Take 1 tablet (20 mg) by mouth daily      famotidine (PEPCID) 20 MG tablet Take 1 tablet (20 mg) by mouth daily      furosemide (LASIX) 40 MG tablet Take 40 mg by mouth daily      GNP DICLOFENAC SODIUM 1 % topical gel APPLY TOPICALLY 4 GM TO RIGHT KNEE FOUR TIMES DAILY AS NEEDED 100 g PRN    guaiFENesin-dextromethorphan (ROBITUSSIN DM) 100-10 MG/5ML syrup Take 10 mLs by mouth every 4 hours as needed for cough      loperamide 1 MG/7.5ML liquid Give as needed for diarrhea; give 2-4 tsp after first loose still then 2 tsp after each additional stool; do not exceed 8 tsp per 72 hours      melatonin 3 MG tablet Take 6 mg by mouth at bedtime      metoprolol succinate ER (TOPROL XL) 25 MG 24 hr tablet Take 1 tablet (25 mg) by mouth daily 90 tablet     mirtazapine (REMERON) 15 MG tablet Take 15 mg by mouth at bedtime      nystatin (MYCOSTATIN) 204418 UNIT/GM external powder Apply topically 2 times daily as needed for other (yeast/redness) Apply to under R breast topically as needed for yeast/redness      risperiDONE (RISPERDAL) 2 MG tablet Take 4 mg by mouth at bedtime      senna-docusate (SENNOSIDES-DOCUSATE SODIUM) 8.6-50 mg tablet Take 1 tablet by mouth 2 times daily as needed      sodium chloride (OCEAN) 0.65 % nasal spray Spray 2 sprays in nostril 2 times daily      umeclidinium (INCRUSE ELLIPTA) 62.5 MCG/ACT inhaler Inhale 1 puff into the lungs daily       No current facility-administered medications for this visit.     Medications are managed by:  SELF, FAMILY, HOME NURSE  Patient uses a pill box to manage their  medications: No  Patient has questions, concerns, or potential side effects/interactions from their medications:  No  GERIATRIC ROS:    Do you have difficulty getting around, watching TV or reading because of poor eyesight? No   Can you hear normal conversational voice? Yes   Do you use hearing aides? Yes  Do you have problems with your memory? Yes   Do you often feel sad or depressed? No  Have you unintentionally lost weight in the last 6 months? No  Do you have trouble with control of your bladder? No   Do you have trouble with control of your bowels? No   Have you had any falls in the past year? No   How many? NA    GENERAL ROS:   General: No unexplained weight gain or loss; adequate sleep pattern.  Resp: No worsening shortness of breath, cough or hemoptysis.   GI: No worsening constipation, no diarrhea, no nausea or vomiting  : Voiding independently with no significant incontinence   Skin: No areas of new skin breakdown or worrisome rashes  Extremities:  No pain, extremity weakness or balance troubles    Objective: /72   Pulse 72   Temp 97  F (36.1  C)   Resp 16   Wt 65.3 kg (144 lb)   BMI 25.51 kg/m     Most recent weight:  144 lb  Gen: Well nourished and in NAD   HEENT: Head is atraumatic, decent dentition  CV: RRR - no murmurs, rubs, or gallups,   Pulm: CTAB, no wheezes/rales/rhonchi, good air entry   ABD: soft, nontender, BS intact  Extrem: no cyanosis, edema or clubbing   RLE: Pain down lateral right hip/thigh. ROM intact. Hip flexion intact. Sensation intact. No calf swelling or redness. No calf pain.   Psych: Euthymic  Neuro: Pt is able to ambulate independently with walker     Preventative Screening:   Vaccinations reviewed and up to date Yes  Get up and Go test:  Completed/Not Applicable.  Time:  NA  Additional Recommended Screening: None     Assessment/ Plan:  1. Nursing home resident  Stable. 60 day visit completed.     2. It band syndrome, right  Distrubution and description consistent  with IT Band Syndrome. Ibuprofen flags as allergy for her but she does not know of this and reports she has never had difficulty breathing, cough, shortness of breath or rash from any medications. Tylenol is not helpful to her and she has been trying Diclofenac gel topically.  Will trial small amount of Ibuprofen and limit this to 14 days and determine further administration if it is effective. PT also recommended if they are able to get PT to facility as family cannot afford to transport her out regularly.   - Ibuprofen Q12h PRN  - Physical therapy consult if able to be seen at the facility       RECOMMENDED FOLLOW UP:  2 months.    Total of minutes was spent in face to face contact with patient with > 50% in counseling and coordination of care.  Options for treatment and/or follow-up care were reviewed with the patient. Ana Gates was engaged and actively involved in the decision making process. She verbalized understanding of the options discussed and was satisfied with the final plan.      Jeannie Reyes MD

## 2024-06-07 ENCOUNTER — MEDICAL CORRESPONDENCE (OUTPATIENT)
Dept: HEALTH INFORMATION MANAGEMENT | Facility: CLINIC | Age: 79
End: 2024-06-07

## 2024-06-19 DIAGNOSIS — Z12.11 SPECIAL SCREENING FOR MALIGNANT NEOPLASMS, COLON: Primary | ICD-10-CM

## 2024-06-26 ENCOUNTER — ORDERS ONLY (AUTO-RELEASED) (OUTPATIENT)
Dept: FAMILY MEDICINE | Facility: CLINIC | Age: 79
End: 2024-06-26
Payer: MEDICARE

## 2024-06-26 DIAGNOSIS — Z12.11 SPECIAL SCREENING FOR MALIGNANT NEOPLASMS, COLON: ICD-10-CM

## 2024-07-07 LAB — NONINV COLON CA DNA+OCC BLD SCRN STL QL: NEGATIVE

## 2024-07-19 ENCOUNTER — TELEPHONE (OUTPATIENT)
Dept: FAMILY MEDICINE | Facility: CLINIC | Age: 79
End: 2024-07-19
Payer: MEDICARE

## 2024-07-19 NOTE — TELEPHONE ENCOUNTER
Per RN notes from Alicia Gonzalez RN at [facility] as follows:    Working with psych to reduce Risperidone d/t ineffective and increased Parkinsonism side effects Fixated on pulling up her depends and pants. No health complaints to staff.     ELLA Butts

## 2024-07-22 ENCOUNTER — NURSING HOME VISIT (OUTPATIENT)
Dept: FAMILY MEDICINE | Facility: CLINIC | Age: 79
End: 2024-07-22
Payer: MEDICARE

## 2024-07-22 ENCOUNTER — DOCUMENTATION ONLY (OUTPATIENT)
Dept: FAMILY MEDICINE | Facility: CLINIC | Age: 79
End: 2024-07-22

## 2024-07-22 DIAGNOSIS — M79.676 PAIN AROUND TOENAIL: ICD-10-CM

## 2024-07-22 DIAGNOSIS — Z78.9 NURSING HOME RESIDENT: Primary | ICD-10-CM

## 2024-07-22 DIAGNOSIS — Q84.5 ENLARGED AND HYPERTROPHIC NAILS: ICD-10-CM

## 2024-07-22 PROCEDURE — 99307 SBSQ NF CARE SF MDM 10: CPT | Mod: GC

## 2024-07-22 NOTE — PROGRESS NOTES
Subjective: Ana Gates is a 79 year old who is seen at home for follow up visit today.  Seen at [facility] AT [place]   [address]  [facility] [address] .    Also present at visit include NA, family member.  Phone: NA  Also present at visit include WILLI, (home nurse, PCA, PT).  Phone: NA  Acute concerns today include: Right 5th toe pain  She notes that recently her right pinky toe over her toenail has been painful. She denies any trauma. She reports that a podiatrist comes to trim her toenails but states it has been awhile and she is due for this. Otherwise, feels well and denies any additional concerns at this time.     Chronic and Past Medical Problems include:  Patient Active Problem List   Diagnosis    Dysthymia    Osteopenia    Psychosis (H)    PTSD (post-traumatic stress disorder)    Dissociative disorder    Somatization disorder    History of fracture of right hip    Depression    Anxiety    Conversion disorder    Gastritis    Eating disorder    Hypertension    Hx of breast cancer    Constipation    Insomnia    Palsy of axillary nerve    Parkinsonism (H)    Delusional disorder, somatic type (H)    Cataracts, bilateral    RLS (restless legs syndrome)    Irregular bowel habits    Nursing home resident     Family History       Problem (# of Occurrences) Relation (Name,Age of Onset)    Kidney Disease (1) Mother    Breast Cancer (1) Mother: 40's    Leukemia (1) Mother    No Known Problems (3) Sister, Daughter, Daughter          Social History:   Current activities:  Reading books, visiting with residents  Support services:  No change  Currently living family/friends:  NA  PMHX/PSHX/MEDS/ALLERGIES/SHX/FHX reviewed and updated in Epic.   MEDICATION LIST:  Current Outpatient Medications   Medication Sig Dispense Refill    acetaminophen (TYLENOL) 500 MG tablet Take 500-1,000 mg by mouth every 6 hours as needed for pain And 1000 mg at bedtime      acetaminophen (TYLENOL) 650 MG suppository Place 1 suppository  (650 mg) rectally every 4 hours as needed for fever      albuterol (PROAIR HFA/PROVENTIL HFA/VENTOLIN HFA) 108 (90 Base) MCG/ACT inhaler Inhale 2 puffs into the lungs every 4 hours as needed for shortness of breath 18 g     alum & mag hydroxide-simethicone (MAALOX MAX) 400-400-40 MG/5ML SUSP suspension Take 15 mLs by mouth every 3 hours as needed for indigestion      calcium carbonate (TUMS) 500 MG chewable tablet Take 2 tablets (1,000 mg) by mouth every 3 hours as needed for heartburn      escitalopram (LEXAPRO) 20 MG tablet Take 1 tablet (20 mg) by mouth daily      famotidine (PEPCID) 20 MG tablet Take 1 tablet (20 mg) by mouth daily      furosemide (LASIX) 40 MG tablet Take 40 mg by mouth daily      GNP DICLOFENAC SODIUM 1 % topical gel APPLY TOPICALLY 4 GM TO RIGHT KNEE FOUR TIMES DAILY AS NEEDED 100 g PRN    guaiFENesin-dextromethorphan (ROBITUSSIN DM) 100-10 MG/5ML syrup Take 10 mLs by mouth every 4 hours as needed for cough      ibuprofen (ADVIL/MOTRIN) 400 MG tablet Take 1 tablet (400 mg) by mouth every 12 hours as needed for moderate pain      loperamide 1 MG/7.5ML liquid Give as needed for diarrhea; give 2-4 tsp after first loose still then 2 tsp after each additional stool; do not exceed 8 tsp per 72 hours      melatonin 3 MG tablet Take 6 mg by mouth at bedtime      metoprolol succinate ER (TOPROL XL) 25 MG 24 hr tablet Take 1 tablet (25 mg) by mouth daily 90 tablet     mirtazapine (REMERON) 15 MG tablet Take 15 mg by mouth at bedtime      nystatin (MYCOSTATIN) 321115 UNIT/GM external powder Apply topically 2 times daily as needed for other (yeast/redness) Apply to under R breast topically as needed for yeast/redness      risperiDONE (RISPERDAL) 2 MG tablet Take 4 mg by mouth at bedtime      senna-docusate (SENNOSIDES-DOCUSATE SODIUM) 8.6-50 mg tablet Take 1 tablet by mouth 2 times daily as needed      sodium chloride (OCEAN) 0.65 % nasal spray Spray 2 sprays in nostril 2 times daily      umeclidinium  (INCRUSE ELLIPTA) 62.5 MCG/ACT inhaler Inhale 1 puff into the lungs daily       No current facility-administered medications for this visit.     Medications are managed by:  SELF, FAMILY, HOME NURSE  Patient uses a pill box to manage their medications: No  Patient has questions, concerns, or potential side effects/interactions from their medications:  No  GERIATRIC ROS:    Do you have difficulty getting around, watching TV or reading because of poor eyesight? No   Can you hear normal conversational voice? Yes   Do you use hearing aides? Yes  Do you have problems with your memory? Yes   Do you often feel sad or depressed? No  Have you unintentionally lost weight in the last 6 months? No  Do you have trouble with control of your bladder? No   Do you have trouble with control of your bowels? No   Have you had any falls in the past year? No   How many? NA    GENERAL ROS:   General: No unexplained weight gain or loss; adequate sleep pattern.  Resp: No worsening shortness of breath, cough or hemoptysis.   GI: No worsening constipation, no diarrhea, no nausea or vomiting  : Voiding independently with no significant incontinence   Skin: No areas of new skin breakdown or worrisome rashes  Extremities:  No pain, extremity weakness or balance troubles    Objective:   /87  P 83  R 16  T 97.3  O2 100% ORA  Most recent weight:  137 lb  Gen: Well nourished and in NAD   HEENT: Head is atraumatic, decent dentition  CV: RRR - no murmurs, rubs, or gallups,   Pulm: CTAB, no wheezes/rales/rhonchi, good air entry   ABD: soft, nontender, BS intact  Extrem: no cyanosis, edema or clubbing; Right fifth toenail with ecchymoses beneath it and nail starting to peel away from nailbed, nontender, normal sensation, normal pulses, normal movement; remainder of toenails are slightly hypertrophic and overgrown  Psych: Euthymic  Neuro: Pt is able to ambulate independently with walker     Preventative Screening:   Vaccinations reviewed and up  to date Yes  Get up and Go test:  Completed/Not Applicable.  Time:  NA  Additional Recommended Screening: None     Assessment/ Plan:  1. Nursing home resident  Stable. 60 day visit completed.     2. Right 5th toenail pain  Recommend be seen by podiatry for toenail trimming and removal    RECOMMENDED FOLLOW UP:  2 months.    Total of minutes was spent in face to face contact with patient with > 50% in counseling and coordination of care.  Options for treatment and/or follow-up care were reviewed with the patient. nAa Gates was engaged and actively involved in the decision making process. She verbalized understanding of the options discussed and was satisfied with the final plan.    Noy Agee DO

## 2024-07-22 NOTE — PROGRESS NOTES
Subjective: Ana Gates is a 79 year old who is seen at home for follow up visit today.  Seen at [facility] AT [place]   [address]  [facility] [address] .    Also present at visit include NA, family member.  Phone: NA  Also present at visit include WILLI, (home nurse, PCA, PT).  Phone: NA  Acute concerns today include: Right 5th toe pain  She notes that recently her right pinky toe over her toenail has been painful. She denies any trauma. She reports that a podiatrist comes to trim her toenails but states it has been awhile and she is due for this. Otherwise, feels well and denies any additional concerns at this time.     Chronic and Past Medical Problems include:  Patient Active Problem List   Diagnosis    Dysthymia    Osteopenia    Psychosis (H)    PTSD (post-traumatic stress disorder)    Dissociative disorder    Somatization disorder    History of fracture of right hip    Depression    Anxiety    Conversion disorder    Gastritis    Eating disorder    Hypertension    Hx of breast cancer    Constipation    Insomnia    Palsy of axillary nerve    Parkinsonism (H)    Delusional disorder, somatic type (H)    Cataracts, bilateral    RLS (restless legs syndrome)    Irregular bowel habits    Nursing home resident     Family History       Problem (# of Occurrences) Relation (Name,Age of Onset)    Kidney Disease (1) Mother    Breast Cancer (1) Mother: 40's    Leukemia (1) Mother    No Known Problems (3) Sister, Daughter, Daughter          Social History:   Current activities:  Reading books, visiting with residents  Support services:  No change  Currently living family/friends:  NA  PMHX/PSHX/MEDS/ALLERGIES/SHX/FHX reviewed and updated in Epic.   MEDICATION LIST:  Current Outpatient Medications   Medication Sig Dispense Refill    acetaminophen (TYLENOL) 500 MG tablet Take 500-1,000 mg by mouth every 6 hours as needed for pain And 1000 mg at bedtime      acetaminophen (TYLENOL) 650 MG suppository Place 1 suppository  (650 mg) rectally every 4 hours as needed for fever      albuterol (PROAIR HFA/PROVENTIL HFA/VENTOLIN HFA) 108 (90 Base) MCG/ACT inhaler Inhale 2 puffs into the lungs every 4 hours as needed for shortness of breath 18 g     alum & mag hydroxide-simethicone (MAALOX MAX) 400-400-40 MG/5ML SUSP suspension Take 15 mLs by mouth every 3 hours as needed for indigestion      calcium carbonate (TUMS) 500 MG chewable tablet Take 2 tablets (1,000 mg) by mouth every 3 hours as needed for heartburn      escitalopram (LEXAPRO) 20 MG tablet Take 1 tablet (20 mg) by mouth daily      famotidine (PEPCID) 20 MG tablet Take 1 tablet (20 mg) by mouth daily      furosemide (LASIX) 40 MG tablet Take 40 mg by mouth daily      GNP DICLOFENAC SODIUM 1 % topical gel APPLY TOPICALLY 4 GM TO RIGHT KNEE FOUR TIMES DAILY AS NEEDED 100 g PRN    guaiFENesin-dextromethorphan (ROBITUSSIN DM) 100-10 MG/5ML syrup Take 10 mLs by mouth every 4 hours as needed for cough      ibuprofen (ADVIL/MOTRIN) 400 MG tablet Take 1 tablet (400 mg) by mouth every 12 hours as needed for moderate pain      loperamide 1 MG/7.5ML liquid Give as needed for diarrhea; give 2-4 tsp after first loose still then 2 tsp after each additional stool; do not exceed 8 tsp per 72 hours      melatonin 3 MG tablet Take 6 mg by mouth at bedtime      metoprolol succinate ER (TOPROL XL) 25 MG 24 hr tablet Take 1 tablet (25 mg) by mouth daily 90 tablet     mirtazapine (REMERON) 15 MG tablet Take 15 mg by mouth at bedtime      nystatin (MYCOSTATIN) 501761 UNIT/GM external powder Apply topically 2 times daily as needed for other (yeast/redness) Apply to under R breast topically as needed for yeast/redness      risperiDONE (RISPERDAL) 2 MG tablet Take 4 mg by mouth at bedtime      senna-docusate (SENNOSIDES-DOCUSATE SODIUM) 8.6-50 mg tablet Take 1 tablet by mouth 2 times daily as needed      sodium chloride (OCEAN) 0.65 % nasal spray Spray 2 sprays in nostril 2 times daily      umeclidinium  (INCRUSE ELLIPTA) 62.5 MCG/ACT inhaler Inhale 1 puff into the lungs daily       No current facility-administered medications for this visit.     Medications are managed by:  SELF, FAMILY, HOME NURSE  Patient uses a pill box to manage their medications: No  Patient has questions, concerns, or potential side effects/interactions from their medications:  No  GERIATRIC ROS:    Do you have difficulty getting around, watching TV or reading because of poor eyesight? No   Can you hear normal conversational voice? Yes   Do you use hearing aides? Yes  Do you have problems with your memory? Yes   Do you often feel sad or depressed? No  Have you unintentionally lost weight in the last 6 months? No  Do you have trouble with control of your bladder? No   Do you have trouble with control of your bowels? No   Have you had any falls in the past year? No   How many? NA    GENERAL ROS:   General: No unexplained weight gain or loss; adequate sleep pattern.  Resp: No worsening shortness of breath, cough or hemoptysis.   GI: No worsening constipation, no diarrhea, no nausea or vomiting  : Voiding independently with no significant incontinence   Skin: No areas of new skin breakdown or worrisome rashes  Extremities:  No pain, extremity weakness or balance troubles    Objective:   /87  P 83  R 16  T 97.3  O2 100% ORA  Most recent weight:  137 lb  Gen: Well nourished and in NAD   HEENT: Head is atraumatic, decent dentition  CV: RRR - no murmurs, rubs, or gallups,   Pulm: CTAB, no wheezes/rales/rhonchi, good air entry   ABD: soft, nontender, BS intact  Extrem: no cyanosis, edema or clubbing; Right fifth toenail with ecchymoses beneath it and nail starting to peel away from nailbed, nontender, normal sensation, normal pulses, normal movement; remainder of toenails are slightly hypertrophic and overgrown  Psych: Euthymic  Neuro: Pt is able to ambulate independently with walker     Preventative Screening:   Vaccinations reviewed and up  to date Yes  Get up and Go test:  Completed/Not Applicable.  Time:  NA  Additional Recommended Screening: None     Assessment/ Plan:  1. Nursing home resident  Stable. 60 day visit completed.     2. Right 5th toenail pain; enlarged and hypertrophic nails  Recommend be seen by podiatry for toenail trimming and removal of right 5th toenail.     RECOMMENDED FOLLOW UP:  2 months.    Total of minutes was spent in face to face contact with patient with > 50% in counseling and coordination of care.  Options for treatment and/or follow-up care were reviewed with the patient. Ana Gates was engaged and actively involved in the decision making process. She verbalized understanding of the options discussed and was satisfied with the final plan.    Noy Agee DO

## 2024-09-06 ENCOUNTER — TELEPHONE (OUTPATIENT)
Dept: FAMILY MEDICINE | Facility: CLINIC | Age: 79
End: 2024-09-06
Payer: MEDICARE

## 2024-09-06 NOTE — TELEPHONE ENCOUNTER
Per RN notes from Alicia Gonzalez RN at [facility] as follows:    She needs an H and P for anesthesia for ECT, we are also going to trial decreasing her ECT to every other week.     ELLA Butts

## 2024-09-10 ENCOUNTER — NURSING HOME VISIT (OUTPATIENT)
Dept: FAMILY MEDICINE | Facility: CLINIC | Age: 79
End: 2024-09-10
Payer: MEDICARE

## 2024-09-10 DIAGNOSIS — Z78.9 NURSING HOME RESIDENT: Primary | ICD-10-CM

## 2024-09-10 DIAGNOSIS — G20.C PARKINSONISM, UNSPECIFIED PARKINSONISM TYPE (H): ICD-10-CM

## 2024-09-10 PROCEDURE — 99308 SBSQ NF CARE LOW MDM 20: CPT | Performed by: FAMILY MEDICINE

## 2024-09-10 NOTE — PROGRESS NOTES
Subjective: Ana Gates is a 79 year old who is seen at home for follow up visit today.  Seen at [facility] AT [place]   [address]  [facility] [address] .    Also present at visit include NA, family member.  Phone: NA  Also present at visit include NA, (home nurse, PCA, PT).  Phone: NA  Acute concerns today include: ECT treatments.  Ana reports the plan is to space these out.     Chronic and Past Medical Problems include:  Patient Active Problem List   Diagnosis    Dysthymia    Osteopenia    Psychosis (H)    PTSD (post-traumatic stress disorder)    Dissociative disorder    Somatization disorder    History of fracture of right hip    Depression    Anxiety    Conversion disorder    Gastritis    Eating disorder    Hypertension    Hx of breast cancer    Constipation    Insomnia    Palsy of axillary nerve    Parkinsonism (H)    Delusional disorder, somatic type (H)    Cataracts, bilateral    RLS (restless legs syndrome)    Irregular bowel habits    Nursing home resident     Family History       Problem (# of Occurrences) Relation (Name,Age of Onset)    Kidney Disease (1) Mother    Breast Cancer (1) Mother: 40's    Leukemia (1) Mother    No Known Problems (3) Sister, Daughter, Daughter          Social History:   Current activities:  Reading books, visiting with residents  Support services:  No change  Currently living family/friends:  NA  PMHX/PSHX/MEDS/ALLERGIES/SHX/FHX reviewed and updated in Epic.   MEDICATION LIST:  Current Outpatient Medications   Medication Sig Dispense Refill    acetaminophen (TYLENOL) 500 MG tablet Take 500-1,000 mg by mouth every 6 hours as needed for pain And 1000 mg at bedtime      acetaminophen (TYLENOL) 650 MG suppository Place 1 suppository (650 mg) rectally every 4 hours as needed for fever      albuterol (PROAIR HFA/PROVENTIL HFA/VENTOLIN HFA) 108 (90 Base) MCG/ACT inhaler Inhale 2 puffs into the lungs every 4 hours as needed for shortness of breath 18 g     alum & mag  hydroxide-simethicone (MAALOX MAX) 400-400-40 MG/5ML SUSP suspension Take 15 mLs by mouth every 3 hours as needed for indigestion      calcium carbonate (TUMS) 500 MG chewable tablet Take 2 tablets (1,000 mg) by mouth every 3 hours as needed for heartburn      escitalopram (LEXAPRO) 20 MG tablet Take 1 tablet (20 mg) by mouth daily      famotidine (PEPCID) 20 MG tablet Take 1 tablet (20 mg) by mouth daily      furosemide (LASIX) 40 MG tablet Take 40 mg by mouth daily      GNP DICLOFENAC SODIUM 1 % topical gel APPLY TOPICALLY 4 GM TO RIGHT KNEE FOUR TIMES DAILY AS NEEDED 100 g PRN    guaiFENesin-dextromethorphan (ROBITUSSIN DM) 100-10 MG/5ML syrup Take 10 mLs by mouth every 4 hours as needed for cough      ibuprofen (ADVIL/MOTRIN) 400 MG tablet Take 1 tablet (400 mg) by mouth every 12 hours as needed for moderate pain      loperamide 1 MG/7.5ML liquid Give as needed for diarrhea; give 2-4 tsp after first loose still then 2 tsp after each additional stool; do not exceed 8 tsp per 72 hours      melatonin 3 MG tablet Take 6 mg by mouth at bedtime      metoprolol succinate ER (TOPROL XL) 25 MG 24 hr tablet Take 1 tablet (25 mg) by mouth daily 90 tablet     mirtazapine (REMERON) 15 MG tablet Take 15 mg by mouth at bedtime      nystatin (MYCOSTATIN) 699365 UNIT/GM external powder Apply topically 2 times daily as needed for other (yeast/redness) Apply to under R breast topically as needed for yeast/redness      risperiDONE (RISPERDAL) 2 MG tablet Take 4 mg by mouth at bedtime      senna-docusate (SENNOSIDES-DOCUSATE SODIUM) 8.6-50 mg tablet Take 1 tablet by mouth 2 times daily as needed      sodium chloride (OCEAN) 0.65 % nasal spray Spray 2 sprays in nostril 2 times daily      umeclidinium (INCRUSE ELLIPTA) 62.5 MCG/ACT inhaler Inhale 1 puff into the lungs daily       No current facility-administered medications for this visit.     Medications are managed by:  SELF, FAMILY, HOME NURSE  Patient uses a pill box to manage  their medications: No  Patient has questions, concerns, or potential side effects/interactions from their medications:  No  GERIATRIC ROS:    Do you have difficulty getting around, watching TV or reading because of poor eyesight? No   Can you hear normal conversational voice? Yes   Do you use hearing aides? Yes  Do you have problems with your memory? Yes   Do you often feel sad or depressed? No  Have you unintentionally lost weight in the last 6 months? No  Do you have trouble with control of your bladder? No   Do you have trouble with control of your bowels? No   Have you had any falls in the past year? No   How many? NA    GENERAL ROS:   General: No unexplained weight gain or loss; adequate sleep pattern.  Resp: No worsening shortness of breath, cough or hemoptysis.   GI: No worsening constipation, no diarrhea, no nausea or vomiting  : Voiding independently with no significant incontinence   Skin: No areas of new skin breakdown or worrisome rashes  Extremities:  No pain, extremity weakness or balance troubles    Objective:   /76  P 81  R 16  T 96.9  O2 95% ORA  Most recent weight:  140 lb  Gen: Well nourished and in NAD   HEENT: Head is atraumatic, decent dentition  CV: RRR - no murmurs, rubs, or gallups,   Pulm: CTAB, no wheezes/rales/rhonchi, good air entry   ABD: soft, nontender, BS intact  Extrem: no cyanosis, edema or clubbing; Right fifth toenail with ecchymoses beneath it and nail starting to peel away from nailbed, nontender, normal sensation, normal pulses, normal movement; remainder of toenails are slightly hypertrophic and overgrown  Psych: Euthymic  Neuro: Seated in bed. Leans to left. Pt is able to ambulate independently with 2 wheeled walker. Unclear on the safety of a 4 wheeled walker. Defer until OT Evaluation completed    Preventative Screening:   Vaccinations reviewed and up to date Yes  Get up and Go test:  Completed/Not Applicable.  Time:  NA  Additional Recommended Screening: None      Assessment/ Plan:  1. Nursing home resident  Stable. 60 day visit completed.     2. Mood Disorder. S/p ECT treatments. Medically optimized for additional treatment(s)  RECOMMENDED FOLLOW UP:  2 months.    Total of minutes was spent in face to face contact with patient with > 50% in counseling and coordination of care.  Options for treatment and/or follow-up care were reviewed with the patient. Ana Gates was engaged and actively involved in the decision making process. She verbalized understanding of the options discussed and was satisfied with the final plan.    Weston Mcdaniel MD

## 2024-09-12 ENCOUNTER — MEDICAL CORRESPONDENCE (OUTPATIENT)
Dept: HEALTH INFORMATION MANAGEMENT | Facility: CLINIC | Age: 79
End: 2024-09-12
Payer: MEDICARE

## 2024-09-18 ENCOUNTER — TELEPHONE (OUTPATIENT)
Dept: FAMILY MEDICINE | Facility: CLINIC | Age: 79
End: 2024-09-18
Payer: MEDICARE

## 2024-09-18 ENCOUNTER — TELEPHONE (OUTPATIENT)
Dept: FAMILY MEDICINE | Facility: CLINIC | Age: 79
End: 2024-09-18

## 2024-09-18 ENCOUNTER — MEDICAL CORRESPONDENCE (OUTPATIENT)
Dept: HEALTH INFORMATION MANAGEMENT | Facility: CLINIC | Age: 79
End: 2024-09-18

## 2024-09-18 NOTE — TELEPHONE ENCOUNTER
Order/Referral Request    Who is requesting: HOME HEALTH CARE    Orders being requested: PT - 2 x a wk for 4 wks and then 1  x a wk for 4 wks    Reason service is needed/diagnosis: fall with head lacerations    When are orders needed by: asa[    Has this been discussed with Provider: No    Does patient have a preference on a Group/Provider/Facility? Home [facility]    Does patient have an appointment scheduled?: Yes: 09/19/2024 at 1200    Where to send orders:  verbal    Okay to leave a detailed message?: Yes at Other phone number:  [phone]     Does patient or caller know when to expect a call? Yes, Nurses will return call within 2-3 business hours.       Leonie Bogres on 9/18/2024 at 11:00 AM

## 2024-09-18 NOTE — TELEPHONE ENCOUNTER
Per RN notes from Alicia Gonzalez RN at [facility] as follows:    RA Resident had a fall on 9/12 in her room and she hit her head, CT shows 4 mm intracranial hemorrhage. No ECT unless approved by primary and ECT MD. She sees neurology in 1 month with repeat CT scan. Hospital return     ELLA Butts

## 2024-09-19 ENCOUNTER — NURSING HOME VISIT (OUTPATIENT)
Dept: FAMILY MEDICINE | Facility: CLINIC | Age: 79
End: 2024-09-19
Payer: MEDICARE

## 2024-09-19 VITALS
TEMPERATURE: 97.3 F | OXYGEN SATURATION: 98 % | HEART RATE: 58 BPM | DIASTOLIC BLOOD PRESSURE: 70 MMHG | WEIGHT: 139 LBS | BODY MASS INDEX: 24.62 KG/M2 | SYSTOLIC BLOOD PRESSURE: 129 MMHG

## 2024-09-19 DIAGNOSIS — Z78.0 ASYMPTOMATIC MENOPAUSAL STATE: ICD-10-CM

## 2024-09-19 DIAGNOSIS — Z09 HOSPITAL DISCHARGE FOLLOW-UP: Primary | ICD-10-CM

## 2024-09-19 DIAGNOSIS — F32.A DEPRESSION, UNSPECIFIED DEPRESSION TYPE: ICD-10-CM

## 2024-09-19 DIAGNOSIS — Z13.820 OSTEOPOROSIS SCREENING: ICD-10-CM

## 2024-09-19 DIAGNOSIS — I62.9 INTRACRANIAL HEMORRHAGE (H): ICD-10-CM

## 2024-09-19 DIAGNOSIS — Z78.9 NURSING HOME RESIDENT: ICD-10-CM

## 2024-09-19 PROBLEM — J44.9 COPD (CHRONIC OBSTRUCTIVE PULMONARY DISEASE) (H): Status: ACTIVE | Noted: 2024-09-19

## 2024-09-19 PROCEDURE — 99309 SBSQ NF CARE MODERATE MDM 30: CPT | Mod: GC

## 2024-09-19 NOTE — PROGRESS NOTES
Subjective: Ana Gates is a 79 year old who is seen at in the nursing home for follow up visit today.  Seen at [facility] AT [place]   [address]  [facility] [address] .    Also present at visit include none  Concerns today include follow-up for hospitalization after patient took a fall and hit her head last Thursday.  She was hospitalized and found to have a 4 mm intracranial hemorrhage on CT.  Repeat CT in the hospital stable.  She has neurology appointment and repeat CT on 10/14/2024 and 10/15/2024.  ECT has been held during this time.  She has been receiving PT.  Patient overall feels afraid to walk after the fall.  She does endorse some mild left heel pain for the last day.  She has not noticed any skin changes.  No numbness or tingling.  She states that she has mild pain at the site of the wound on her head from her fall.  She has no other questions or concerns today.  Chronic and Past Medical Problems include:  Patient Active Problem List   Diagnosis    Dysthymia    Osteopenia    Psychosis (H)    PTSD (post-traumatic stress disorder)    Dissociative disorder    Somatization disorder    History of fracture of right hip    Depression    Anxiety    Conversion disorder    Gastritis    Eating disorder    Hypertension    Hx of breast cancer    Constipation    Insomnia    Palsy of axillary nerve    Parkinsonism (H)    Delusional disorder, somatic type (H)    Cataracts, bilateral    RLS (restless legs syndrome)    Irregular bowel habits    Nursing home resident    Intracranial hemorrhage (H)    COPD (chronic obstructive pulmonary disease) (H)     Family History       Problem (# of Occurrences) Relation (Name,Age of Onset)    Kidney Disease (1) Mother    Breast Cancer (1) Mother: 40's    Leukemia (1) Mother    No Known Problems (3) Sister, Daughter, Daughter        Social History:   Current activities:  Reading  PMHX/PSHX/MEDS/ALLERGIES/SHX/FHX reviewed and updated in Epic.   MEDICATION LIST:  Current  Outpatient Medications   Medication Sig Dispense Refill    acetaminophen (TYLENOL) 500 MG tablet Take 500-1,000 mg by mouth every 6 hours as needed for pain And 1000 mg at bedtime      acetaminophen (TYLENOL) 650 MG suppository Place 1 suppository (650 mg) rectally every 4 hours as needed for fever      albuterol (PROAIR HFA/PROVENTIL HFA/VENTOLIN HFA) 108 (90 Base) MCG/ACT inhaler Inhale 2 puffs into the lungs every 4 hours as needed for shortness of breath 18 g     alum & mag hydroxide-simethicone (MAALOX MAX) 400-400-40 MG/5ML SUSP suspension Take 15 mLs by mouth every 3 hours as needed for indigestion      calcium carbonate (TUMS) 500 MG chewable tablet Take 2 tablets (1,000 mg) by mouth every 3 hours as needed for heartburn      escitalopram (LEXAPRO) 20 MG tablet Take 1 tablet (20 mg) by mouth daily      famotidine (PEPCID) 20 MG tablet Take 1 tablet (20 mg) by mouth daily      furosemide (LASIX) 40 MG tablet Take 40 mg by mouth daily      GNP DICLOFENAC SODIUM 1 % topical gel APPLY TOPICALLY 4 GM TO RIGHT KNEE FOUR TIMES DAILY AS NEEDED 100 g PRN    guaiFENesin-dextromethorphan (ROBITUSSIN DM) 100-10 MG/5ML syrup Take 10 mLs by mouth every 4 hours as needed for cough      loperamide 1 MG/7.5ML liquid Give as needed for diarrhea; give 2-4 tsp after first loose still then 2 tsp after each additional stool; do not exceed 8 tsp per 72 hours      melatonin 3 MG tablet Take 6 mg by mouth at bedtime      metoprolol succinate ER (TOPROL XL) 25 MG 24 hr tablet Take 1 tablet (25 mg) by mouth daily 90 tablet     mirtazapine (REMERON) 15 MG tablet Take 15 mg by mouth at bedtime      nystatin (MYCOSTATIN) 262639 UNIT/GM external powder Apply topically 2 times daily as needed for other (yeast/redness) Apply to under R breast topically as needed for yeast/redness      risperiDONE (RISPERDAL) 2 MG tablet Take 3 mg by mouth at bedtime.      senna-docusate (SENNOSIDES-DOCUSATE SODIUM) 8.6-50 mg tablet Take 1 tablet by mouth 2  times daily as needed      sodium chloride (OCEAN) 0.65 % nasal spray Spray 2 sprays in nostril 2 times daily      umeclidinium (INCRUSE ELLIPTA) 62.5 MCG/ACT inhaler Inhale 1 puff into the lungs daily       No current facility-administered medications for this visit.   Medications are managed by:  nursing home  GERIATRIC ROS:    Do you have difficulty getting around, watching TV or reading because of poor eyesight? Yes  Can you hear normal conversational voice? Yes  Do you have problems with your memory? Yes   Do you often feel sad or depressed? No   GENERAL ROS:   General: No unexplained weight gain or loss; adequate sleep pattern.  Resp: No worsening shortness of breath, cough or hemoptysis.   GI: No worsening constipation, no diarrhea, no nausea or vomiting  : Voiding independently with no significant incontinence   Skin: No areas of new skin breakdown or worrisome rashes  Extremities:  POSITIVE pain L heel, no extremity weakness    Objective: /70   Pulse 58   Temp 97.3  F (36.3  C)   Wt 63 kg (139 lb)   SpO2 98%   BMI 24.62 kg/m     Most recent weight:  139 lb  Gen: Well nourished and in NAD, sitting on bed  HEENT: Small ecchymoses covered by Band-Aid with few sutures on the left anterior forehead  CV: RRR - no murmurs, rubs, or gallups   Pulm: CTAB, no wheezes/rales/rhonchi, good air entry   ABD: soft, nontender, BS intact  Extrem: no cyanosis, edema or clubbing on exposed skin, left heel with no pain to palpation, no overlying skin changes, full range of motion of left ankle  Psych: Euthymic  Neuro: Patient ambulates with walker    Preventative Screening:   Vaccinations reviewed and to be updated as planned  DEXA, as below     Assessment/ Plan:  Diagnoses and all orders for this visit:    Hospital discharge follow-up  Nursing home resident  Intracranial hemorrhage (H)  Medically stable after recent hospitalization for mechanical fall complicated by head trauma found to have intracranial  hemorrhage stable on imaging prior to hospital discharge.  Patient without obvious neurological deficits today.  Follow-up with neurosurgery on 10/14/2024 and 10/15/2024 for repeat CT imaging and office visit.  Recommend ongoing PT for safe ambulation. Agree with moving patient's room closer to nursing station. Recommend removal of sutures at this time.. Reviewed discharge summary from recent hospitalization with no additional follow-up needed besides neurosurgery as above and suture removal.    Depression, unspecified depression type  Continue to hold ECT at this time pending neurosurgery reevaluation as above. Mood stable.    Osteoporosis screening  Asymptomatic menopausal state  Patient has not had a DEXA scan done before.  Given recurrent falls, may benefit from therapy for possible osteopenia or osteoporosis.  -     DX Bone Density; Future    RECOMMENDED FOLLOW UP:  2 months.    Yasmin Mendieta MD

## 2024-09-26 ENCOUNTER — DOCUMENTATION ONLY (OUTPATIENT)
Dept: FAMILY MEDICINE | Facility: CLINIC | Age: 79
End: 2024-09-26
Payer: MEDICARE

## 2024-09-26 NOTE — PROGRESS NOTES
To be completed in Nursing note:    Please reference list for forms that require a visit for completion.  Please remind patients that providers are given 3-5 business days to complete and return forms.      Form type:  Certification and plan of care- order number: [id number]    Date form received: 24    Date form completed by Physician:  24    How was form returned to patient (mailed, faxed, or at  for patient to ):  faxed to [employer] [facility] [phone]    Date form mailed/faxed/left at  for patient and sent to HIM for [email or web address]      Once form is left for patient, faxed, or mailed PCS will then close the documentation only encounter.

## 2024-09-27 DIAGNOSIS — Z53.9 DIAGNOSIS NOT YET DEFINED: Primary | ICD-10-CM

## 2024-11-05 ENCOUNTER — MEDICAL CORRESPONDENCE (OUTPATIENT)
Dept: HEALTH INFORMATION MANAGEMENT | Facility: CLINIC | Age: 79
End: 2024-11-05
Payer: MEDICARE

## 2024-11-05 ENCOUNTER — TRANSFERRED RECORDS (OUTPATIENT)
Dept: HEALTH INFORMATION MANAGEMENT | Facility: CLINIC | Age: 79
End: 2024-11-05
Payer: MEDICARE

## 2024-11-07 ENCOUNTER — TELEPHONE (OUTPATIENT)
Dept: FAMILY MEDICINE | Facility: CLINIC | Age: 79
End: 2024-11-07
Payer: MEDICARE

## 2024-11-07 NOTE — TELEPHONE ENCOUNTER
"Per RN notes from Alicia Gonzalez RN at [facility] as follows:     Resident had a fall with \"head strike\" on 11/5 Sent to ED for evaluation so she could receive ECT on 11/6. It has been reported on her last CT scans that she has a 5 mm nodule in her lungs that maybe should have a f/u CT. One documentation stated that it was a stable benign nodule? PT is attempting to recertify her for more therapy minutes since she continues to struggle with her behaviors and not wanting to do things for herself.     ELLA Butts    "

## 2024-11-11 ENCOUNTER — NURSING HOME VISIT (OUTPATIENT)
Dept: FAMILY MEDICINE | Facility: CLINIC | Age: 79
End: 2024-11-11
Payer: MEDICARE

## 2024-11-11 VITALS
RESPIRATION RATE: 16 BRPM | SYSTOLIC BLOOD PRESSURE: 116 MMHG | TEMPERATURE: 96.7 F | WEIGHT: 131 LBS | OXYGEN SATURATION: 97 % | DIASTOLIC BLOOD PRESSURE: 62 MMHG | HEART RATE: 72 BPM | BODY MASS INDEX: 23.21 KG/M2

## 2024-11-11 DIAGNOSIS — Z78.9 NURSING HOME RESIDENT: Primary | ICD-10-CM

## 2024-11-11 DIAGNOSIS — G20.C PARKINSONISM, UNSPECIFIED PARKINSONISM TYPE (H): ICD-10-CM

## 2024-11-11 DIAGNOSIS — Z78.0 POSTMENOPAUSAL: ICD-10-CM

## 2024-11-11 DIAGNOSIS — Z13.820 OSTEOPOROSIS SCREENING: ICD-10-CM

## 2024-11-11 NOTE — PROGRESS NOTES
Subjective: Ana Gates is a 79 year old who is seen at in the nursing home for follow up visit today.  Seen at [facility] AT [place]   [address]  [facility] [address] .    Acute concerns today include: None. Tremor noted on today's exam, however patient reports this is not new and does not bother her. Undergoing weekly ECTs per psychiatrist, medications being adjusted. Endorses some difficulty with memory she attributes to her ECTs. No further questions/concerns.    Chronic and Past Medical Problems include:  Patient Active Problem List   Diagnosis    Dysthymia    Osteopenia    Psychosis (H)    PTSD (post-traumatic stress disorder)    Dissociative disorder    Somatization disorder    History of fracture of right hip    Depression    Anxiety    Conversion disorder    Gastritis    Eating disorder    Hypertension    Hx of breast cancer    Constipation    Insomnia    Palsy of axillary nerve    Parkinsonism (H)    Delusional disorder, somatic type (H)    Cataracts, bilateral    RLS (restless legs syndrome)    Irregular bowel habits    Nursing home resident    Intracranial hemorrhage (H)    COPD (chronic obstructive pulmonary disease) (H)     Family History       Problem (# of Occurrences) Relation (Name,Age of Onset)    Kidney Disease (1) Mother    Breast Cancer (1) Mother: 40's    Leukemia (1) Mother    No Known Problems (3) Sister, Daughter, Daughter          Social History:   Current activities:  Chairobics, reading less    PMHX/PSHX/MEDS/ALLERGIES/SHX/FHX reviewed and updated in Epic.   MEDICATION LIST:  Current Outpatient Medications   Medication Sig Dispense Refill    acetaminophen (TYLENOL) 500 MG tablet Take 500-1,000 mg by mouth every 6 hours as needed for pain And 1000 mg at bedtime      acetaminophen (TYLENOL) 650 MG suppository Place 1 suppository (650 mg) rectally every 4 hours as needed for fever      albuterol (PROAIR HFA/PROVENTIL HFA/VENTOLIN HFA) 108 (90 Base) MCG/ACT inhaler Inhale 2 puffs  into the lungs every 4 hours as needed for shortness of breath 18 g     alum & mag hydroxide-simethicone (MAALOX MAX) 400-400-40 MG/5ML SUSP suspension Take 15 mLs by mouth every 3 hours as needed for indigestion      calcium carbonate (TUMS) 500 MG chewable tablet Take 2 tablets (1,000 mg) by mouth every 3 hours as needed for heartburn      escitalopram (LEXAPRO) 20 MG tablet Take 1 tablet (20 mg) by mouth daily      famotidine (PEPCID) 20 MG tablet Take 1 tablet (20 mg) by mouth daily      furosemide (LASIX) 40 MG tablet Take 40 mg by mouth daily      GNP DICLOFENAC SODIUM 1 % topical gel APPLY TOPICALLY 4 GM TO RIGHT KNEE FOUR TIMES DAILY AS NEEDED 100 g PRN    guaiFENesin-dextromethorphan (ROBITUSSIN DM) 100-10 MG/5ML syrup Take 10 mLs by mouth every 4 hours as needed for cough      loperamide 1 MG/7.5ML liquid Give as needed for diarrhea; give 2-4 tsp after first loose still then 2 tsp after each additional stool; do not exceed 8 tsp per 72 hours      melatonin 3 MG tablet Take 6 mg by mouth at bedtime      metoprolol succinate ER (TOPROL XL) 25 MG 24 hr tablet Take 1 tablet (25 mg) by mouth daily 90 tablet     mirtazapine (REMERON) 15 MG tablet Take 15 mg by mouth at bedtime      nystatin (MYCOSTATIN) 617753 UNIT/GM external powder Apply topically 2 times daily as needed for other (yeast/redness) Apply to under R breast topically as needed for yeast/redness      risperiDONE (RISPERDAL) 2 MG tablet Take 3 mg by mouth at bedtime.      senna-docusate (SENNOSIDES-DOCUSATE SODIUM) 8.6-50 mg tablet Take 1 tablet by mouth 2 times daily as needed      sodium chloride (OCEAN) 0.65 % nasal spray Spray 2 sprays in nostril 2 times daily      umeclidinium (INCRUSE ELLIPTA) 62.5 MCG/ACT inhaler Inhale 1 puff into the lungs daily       No current facility-administered medications for this visit.     Medications are managed by:  HOME NURSE  Patient has questions, concerns, or potential side effects/interactions from their  "medications:  Yes - concerned about one medication that causes shaking.  GERIATRIC ROS:    Do you have difficulty getting around, watching TV or reading because of poor eyesight? No   Can you hear normal conversational voice? No   Do you use hearing aides? No   Do you have problems with your memory? \"Sometimes because the ECT I forget.\"  Do you often feel sad or depressed? No   Have you unintentionally lost weight in the last 6 months?  \"I think so.\"  Do you have trouble with control of your bladder? No   Do you have trouble with control of your bowels? No \"I've had accidents before but they're not common.\"  Have you had any falls in the past year? Yes, several    GENERAL ROS:   General: \"I take too long to fall asleep and I wake up too early.\"  Resp: No worsening shortness of breath, cough or hemoptysis.   GI: No worsening constipation, no diarrhea, no nausea or vomiting  : Voiding independently with no significant incontinence   Skin: No areas of new skin breakdown or worrisome rashes  Extremities:  No pain, extremity weakness. Endorses difficulty with balance    Objective: /62   Pulse 72   Temp (!) 96.7  F (35.9  C)   Resp 16   Wt 59.4 kg (131 lb)   SpO2 97%   BMI 23.21 kg/m     Most recent weight:  131  Gen: Well nourished and in NAD, laying in bed.    HEENT: Head is atraumatic, decent dentition  CV: RRR - no murmurs, rubs, or gallups  Pulm: CTAB, no wheezes/rales/rhonchi, good air entry   ABD: soft, nontender, BS intact  Extrem: no cyanosis, edema or clubbing   Psych: Euthymic  Neuro: Pt is able to ambulate independently with walker, sometimes forgets    Preventative Screening:   Vaccinations reviewed and up to date  Additional Recommended Screening: DEXA scan not yet completed, records demonstrated multiple attempts to schedule/call without success.    Assessment/ Plan:  Nursing home resident   Follow-up visit  Osteoporosis screening, hx multiple falls, postmenopausal  Has not completed DEXA " scan, with multiple falls would benefit from routine scan. Re-ordered today.  Parkinsonism, unspecified Parkinsonism type (H)  Patient seen to have mild tremor with action today. Medications being adjusted per psychiatrist and patient undergoing weekly ECTs.    RECOMMENDED FOLLOW UP:  2 months.    Arsenio Galo DO PGY-2  [facility] Medicine Residency  11/11/24    Precepted today with Dr. Winston Eubanks.

## 2024-11-17 ENCOUNTER — MEDICAL CORRESPONDENCE (OUTPATIENT)
Dept: HEALTH INFORMATION MANAGEMENT | Facility: CLINIC | Age: 79
End: 2024-11-17

## 2024-11-18 ENCOUNTER — DOCUMENTATION ONLY (OUTPATIENT)
Dept: FAMILY MEDICINE | Facility: CLINIC | Age: 79
End: 2024-11-18
Payer: MEDICARE

## 2024-11-18 NOTE — PROGRESS NOTES
To be completed in Nursing note:    Please reference list for forms that require a visit for completion.  Please remind patients that providers are given 3-5 business days to complete and return forms.      Form type: Add on Discipline - order number: [id number]     Date form received: 24    Date form completed by Physician:  24    How was form returned to patient (mailed, faxed, or at  for patient to ):  faxed to [facility] [phone]    Date form mailed/faxed/left at  for patient and sent to HIM for scannin24      Once form is left for patient, faxed, or mailed PCS will then close the documentation only encounter.

## 2024-11-18 NOTE — PROGRESS NOTES
To be completed in Nursing note:    Please reference list for forms that require a visit for completion.  Please remind patients that providers are given 3-5 business days to complete and return forms.      Form type:   Certification and plan of care - order number: [id number]    Date form received:  24    Date form completed by Physician:  24    How was form returned to patient (mailed, faxed, or at  for patient to ):  faxed to [facility] [phone]    Date form mailed/faxed/left at  for patient and sent to HIM for scannin24      Once form is left for patient, faxed, or mailed PCS will then close the documentation only encounter.

## 2024-11-19 ENCOUNTER — MEDICAL CORRESPONDENCE (OUTPATIENT)
Dept: HEALTH INFORMATION MANAGEMENT | Facility: CLINIC | Age: 79
End: 2024-11-19
Payer: MEDICARE

## 2024-11-21 DIAGNOSIS — Z53.9 DIAGNOSIS NOT YET DEFINED: Primary | ICD-10-CM

## 2024-12-13 ENCOUNTER — MEDICAL CORRESPONDENCE (OUTPATIENT)
Dept: HEALTH INFORMATION MANAGEMENT | Facility: CLINIC | Age: 79
End: 2024-12-13
Payer: MEDICARE

## 2024-12-26 ENCOUNTER — TELEPHONE (OUTPATIENT)
Dept: FAMILY MEDICINE | Facility: CLINIC | Age: 79
End: 2024-12-26
Payer: MEDICARE

## 2024-12-31 ENCOUNTER — NURSING HOME VISIT (OUTPATIENT)
Dept: FAMILY MEDICINE | Facility: CLINIC | Age: 79
End: 2024-12-31
Payer: MEDICARE

## 2024-12-31 DIAGNOSIS — Z78.9 NURSING HOME RESIDENT: Primary | ICD-10-CM

## 2024-12-31 DIAGNOSIS — G20.C PARKINSONISM, UNSPECIFIED PARKINSONISM TYPE (H): ICD-10-CM

## 2024-12-31 NOTE — PROGRESS NOTES
Subjective: Ana Gates is a 79 year old who is seen at in the nursing home for follow up visit today.  Seen at [facility]   Acute concerns today include: she has had a hard time wiping herself with BM. Staff does help with wiping and she wears pull ups. Sleep and appetite are ok.    No further questions/concerns.     Chronic and Past Medical Problems include:  Patient Active Problem List   Diagnosis    Dysthymia    Osteopenia    Psychosis (H)    PTSD (post-traumatic stress disorder)    Dissociative disorder    Somatization disorder    History of fracture of right hip    Depression    Anxiety    Conversion disorder    Gastritis    Eating disorder    Hypertension    Hx of breast cancer    Constipation    Insomnia    Palsy of axillary nerve    Parkinsonism (H)    Delusional disorder, somatic type (H)    Cataracts, bilateral    RLS (restless legs syndrome)    Irregular bowel habits    Nursing home resident    Intracranial hemorrhage (H)    COPD (chronic obstructive pulmonary disease) (H)     Family History       Problem (# of Occurrences) Relation (Name,Age of Onset)    Kidney Disease (1) Mother    Breast Cancer (1) Mother: 40's    Leukemia (1) Mother    No Known Problems (3) Sister, Daughter, Daughter          Social History:   Current activities:  chairobics, reading   PMHX/PSHX/MEDS/ALLERGIES/SHX/FHX reviewed and updated in Epic.   MEDICATION LIST:  Current Outpatient Medications   Medication Sig Dispense Refill    acetaminophen (TYLENOL) 500 MG tablet Take 500-1,000 mg by mouth every 6 hours as needed for pain And 1000 mg at bedtime      acetaminophen (TYLENOL) 650 MG suppository Place 1 suppository (650 mg) rectally every 4 hours as needed for fever      albuterol (PROAIR HFA/PROVENTIL HFA/VENTOLIN HFA) 108 (90 Base) MCG/ACT inhaler Inhale 2 puffs into the lungs every 4 hours as needed for shortness of breath 18 g     alum & mag hydroxide-simethicone (MAALOX MAX) 400-400-40 MG/5ML SUSP suspension  Take 15 mLs by mouth every 3 hours as needed for indigestion      calcium carbonate (TUMS) 500 MG chewable tablet Take 2 tablets (1,000 mg) by mouth every 3 hours as needed for heartburn      escitalopram (LEXAPRO) 20 MG tablet Take 1 tablet (20 mg) by mouth daily      famotidine (PEPCID) 20 MG tablet Take 1 tablet (20 mg) by mouth daily      furosemide (LASIX) 40 MG tablet Take 40 mg by mouth daily      GNP DICLOFENAC SODIUM 1 % topical gel APPLY TOPICALLY 4 GM TO RIGHT KNEE FOUR TIMES DAILY AS NEEDED 100 g PRN    guaiFENesin-dextromethorphan (ROBITUSSIN DM) 100-10 MG/5ML syrup Take 10 mLs by mouth every 4 hours as needed for cough      loperamide 1 MG/7.5ML liquid Give as needed for diarrhea; give 2-4 tsp after first loose still then 2 tsp after each additional stool; do not exceed 8 tsp per 72 hours      melatonin 3 MG tablet Take 3 mg by mouth at bedtime.      metoprolol succinate ER (TOPROL XL) 25 MG 24 hr tablet Take 1 tablet (25 mg) by mouth daily 90 tablet     mirtazapine (REMERON) 15 MG tablet Take 15 mg by mouth at bedtime      nystatin (MYCOSTATIN) [id number] UNIT/GM external powder Apply topically 2 times daily as needed for other (yeast/redness) Apply to under R breast topically as needed for yeast/redness      risperiDONE (RISPERDAL) 2 MG tablet Take 2 mg by mouth at bedtime.      senna-docusate (SENNOSIDES-DOCUSATE SODIUM) 8.6-50 mg tablet Take 1 tablet by mouth 2 times daily as needed      sodium chloride (OCEAN) 0.65 % nasal spray Spray 2 sprays in nostril 2 times daily      umeclidinium (INCRUSE ELLIPTA) 62.5 MCG/ACT inhaler Inhale 1 puff into the lungs daily       No current facility-administered medications for this visit.     Medications are managed by:  HOME NURSE  Patient has questions, concerns, or potential side effects/interactions from their medications:  No  GERIATRIC ROS:    Do you have difficulty getting around, watching TV or reading because of poor eyesight? No   Can you hear normal  "conversational voice? Yes   Do you use hearing aides? No   Do you have problems with your memory? Yes, sometimes (thinks due to ECT)  Do you often feel sad or depressed? No   Have you unintentionally lost weight in the last 6 months? No   Do you have trouble with control of your bladder? No   Do you have trouble with control of your bowels? \"Sometimes but I wear pull ups\"  Have you had any falls in the past year? Yes     GENERAL ROS:   General: No unexplained weight gain or loss; adequate sleep pattern.  Resp: No worsening shortness of breath, cough or hemoptysis.   GI: No worsening constipation, no diarrhea, no nausea or vomiting  : Voiding independently with no significant incontinence   Skin: No areas of new skin breakdown or worrisome rashes  Extremities:  No pain, extremity weakness or balance troubles.     Objective: There were no vitals taken for this visit.   Most recent weight:  131 lbs  Gen: Well nourished and in NAD sitting edge of bed  HEENT: Head is atraumatic, decent dentition  CV: RRR - no murmurs, rubs, or gallups,   Pulm: CTAB, no wheezes/rales/rhonchi, good air entry   ABD: soft, nontender, BS intact  Extrem: no cyanosis, edema or clubbing   Psych: Euthymic  Neuro: Pt is able to ambulate independently with walker     Preventative Screening:   Vaccinations reviewed and up to date   Additional Recommended Screening: needs DEXA, ordered but not done . Records demonstrated multiple attempts to schedule/call without success.      Assessment/ Plan:  Nursing home resident   Follow-up visit, stable. no changes to medications made today   Parkinsonism, unspecified Parkinsonism type (H)  Patient with mild tremor in the past, not observed today. Medications being adjusted per psychiatrist and patient undergoing weekly ECTs.    RECOMMENDED FOLLOW UP:  2 months.    Raghu Silverio MD  Precepted today with Dr. Mcdaniel.              "

## 2025-01-07 ENCOUNTER — DOCUMENTATION ONLY (OUTPATIENT)
Dept: FAMILY MEDICINE | Facility: CLINIC | Age: 80
End: 2025-01-07
Payer: MEDICARE

## 2025-01-07 NOTE — PROGRESS NOTES
To be completed in Nursing note:    Please reference list for forms that require a visit for completion.  Please remind patients that providers are given 3-5 business days to complete and return forms.      Form type:  Physicians order- order number: [id number]    Date form received: 25    Date form completed by Physician:  25    How was form returned to patient (mailed, faxed, or at  for patient to ):  faxed to [facility] [phone]    Date form mailed/faxed/left at  for patient and sent to HIM for scannin25      Once form is left for patient, faxed, or mailed PCS will then close the documentation only encounter.

## 2025-01-28 ENCOUNTER — DOCUMENTATION ONLY (OUTPATIENT)
Dept: FAMILY MEDICINE | Facility: CLINIC | Age: 80
End: 2025-01-28
Payer: MEDICARE

## 2025-01-28 NOTE — PROGRESS NOTES
To be completed in Nursing note:    Please reference list for forms that require a visit for completion.  Please remind patients that providers are given 3-5 business days to complete and return forms.      Form type:  Physician order- order number: [id number]    Date form received: 25    Date form completed by Physician:  25    How was form returned to patient (mailed, faxed, or at  for patient to ):  faxed to [facility] [phone]    Date form mailed/faxed/left at  for patient and sent to HIM for scannin25      Once form is left for patient, faxed, or mailed PCS will then close the documentation only encounter.

## 2025-02-05 ENCOUNTER — MEDICAL CORRESPONDENCE (OUTPATIENT)
Dept: HEALTH INFORMATION MANAGEMENT | Facility: CLINIC | Age: 80
End: 2025-02-05
Payer: MEDICARE

## 2025-02-11 ENCOUNTER — MEDICAL CORRESPONDENCE (OUTPATIENT)
Dept: HEALTH INFORMATION MANAGEMENT | Facility: CLINIC | Age: 80
End: 2025-02-11
Payer: MEDICARE

## 2025-02-24 ENCOUNTER — NURSING HOME VISIT (OUTPATIENT)
Dept: FAMILY MEDICINE | Facility: CLINIC | Age: 80
End: 2025-02-24
Payer: MEDICARE

## 2025-02-24 DIAGNOSIS — Z78.9 NURSING HOME RESIDENT: Primary | ICD-10-CM

## 2025-02-24 DIAGNOSIS — G20.C PARKINSONISM, UNSPECIFIED PARKINSONISM TYPE (H): ICD-10-CM

## 2025-02-24 DIAGNOSIS — G25.0 ESSENTIAL TREMOR: ICD-10-CM

## 2025-02-24 NOTE — Clinical Note
This one was weighing on me a bit, patient stated quality of life worsening by what sounds like essential tremor (specifically when eating). BP systolic of 142 today and pulse of 72. Could consider propranolol. I also like weighted utensils although that probably wouldn't happen at the nursing home.  Best, Arsalan

## 2025-02-24 NOTE — PROGRESS NOTES
Subjective: Ana Gates is a 79 year old who is seen at in the nursing home for follow up visit today.  Seen at [facility]   Acute concerns today: patient states that she has a slightly worsening tremor over the past few months. She states that when she is at rest she will notice her hand moving, she also has tremor with movement such as eating soup.   Chronic and Past Medical Problems include:  Patient Active Problem List   Diagnosis    Dysthymia    Osteopenia    Psychosis (H)    PTSD (post-traumatic stress disorder)    Dissociative disorder    Somatization disorder    History of fracture of right hip    Depression    Anxiety    Conversion disorder    Gastritis    Eating disorder    Hypertension    Hx of breast cancer    Constipation    Insomnia    Palsy of axillary nerve    Parkinsonism (H)    Delusional disorder, somatic type (H)    Cataracts, bilateral    RLS (restless legs syndrome)    Irregular bowel habits    Nursing home resident    Intracranial hemorrhage (H)    COPD (chronic obstructive pulmonary disease) (H)     Family History       Problem (# of Occurrences) Relation (Name,Age of Onset)    Kidney Disease (1) Mother    Breast Cancer (1) Mother: 40's    Leukemia (1) Mother    No Known Problems (3) Sister, Daughter, Daughter          Social History:   Current activities:  Chairobics and reading   PMHX/PSHX/MEDS/ALLERGIES/SHX/FHX reviewed and updated in Epic.   MEDICATION LIST:  Current Outpatient Medications   Medication Sig Dispense Refill    acetaminophen (TYLENOL) 500 MG tablet Take 500-1,000 mg by mouth every 6 hours as needed for pain And 1000 mg at bedtime      acetaminophen (TYLENOL) 650 MG suppository Place 1 suppository (650 mg) rectally every 4 hours as needed for fever      albuterol (PROAIR HFA/PROVENTIL HFA/VENTOLIN HFA) 108 (90 Base) MCG/ACT inhaler Inhale 2 puffs into the lungs every 4 hours as needed for shortness of breath 18 g     alum & mag hydroxide-simethicone (MAALOX  MAX) 400-400-40 MG/5ML SUSP suspension Take 15 mLs by mouth every 3 hours as needed for indigestion      calcium carbonate (TUMS) 500 MG chewable tablet Take 2 tablets (1,000 mg) by mouth every 3 hours as needed for heartburn      escitalopram (LEXAPRO) 20 MG tablet Take 1 tablet (20 mg) by mouth daily      famotidine (PEPCID) 20 MG tablet Take 1 tablet (20 mg) by mouth daily      furosemide (LASIX) 40 MG tablet Take 40 mg by mouth daily      GNP DICLOFENAC SODIUM 1 % topical gel APPLY TOPICALLY 4 GM TO RIGHT KNEE FOUR TIMES DAILY AS NEEDED 100 g PRN    guaiFENesin-dextromethorphan (ROBITUSSIN DM) 100-10 MG/5ML syrup Take 10 mLs by mouth every 4 hours as needed for cough      loperamide 1 MG/7.5ML liquid Give as needed for diarrhea; give 2-4 tsp after first loose still then 2 tsp after each additional stool; do not exceed 8 tsp per 72 hours      melatonin 3 MG tablet Take 3 mg by mouth at bedtime.      metoprolol succinate ER (TOPROL XL) 25 MG 24 hr tablet Take 1 tablet (25 mg) by mouth daily 90 tablet     mirtazapine (REMERON) 15 MG tablet Take 15 mg by mouth at bedtime      nystatin (MYCOSTATIN) 171335 UNIT/GM external powder Apply topically 2 times daily as needed for other (yeast/redness) Apply to under R breast topically as needed for yeast/redness      risperiDONE (RISPERDAL) 2 MG tablet Take 2 mg by mouth at bedtime.      senna-docusate (SENNOSIDES-DOCUSATE SODIUM) 8.6-50 mg tablet Take 1 tablet by mouth 2 times daily as needed      sodium chloride (OCEAN) 0.65 % nasal spray Spray 2 sprays in nostril 2 times daily      umeclidinium (INCRUSE ELLIPTA) 62.5 MCG/ACT inhaler Inhale 1 puff into the lungs daily       No current facility-administered medications for this visit.     Medications are managed by:  SELF, FAMILY, HOME NURSE  Patient uses a pill box to manage their medications:  Yes / No  Patient has questions, concerns, or potential side effects/interactions from their medications:  Yes / No  GERIATRIC  ROS:    Do you have difficulty getting around, watching TV or reading because of poor eyesight?  No   No changes in appetite   No changes in sleep   Baseline memory difficulties   Do you often feel sad or depressed? No     Objective: There were no vitals taken for this visit.     Gen: Well nourished and in NAD   HEENT: Head is atraumatic, decent dentition  CV: RRR - no murmurs, rubs, or gallups,   Pulm: CTAB, no wheezes/rales/rhonchi, good air entry   ABD: soft, nontender, BS intact  Extrem: no cyanosis, edema or clubbing   Psych: Euthymic  Neuro: Pt is able to ambulate independently. Resting tremor noted on exam seen worse on the right  side, minimal tremor with pointing, slight increased tone in the left upper extremity.     Preventative Screening:   Vaccinations reviewed and up to date   Additional Recommended Screening: needs DEXA, ordered but not done . Records demonstrated multiple attempts to schedule/call without success.     Assessment/ Plan:  There are no diagnoses linked to this encounter.   Nursing home patient  Parkinsonism, unspecified Parkinsonism type (H)  Essential Tremor  Patient noticed that she has a tremor that is getting worse. She feels it when she is eating soup anything with a spoon. She does have some slight increase tone in her right upper extremity. I think likely she is at baseline level of parkinsonism although may have worsening of an essential tremor. Could consider propranolol if this continues to be bothersome.   RECOMMENDED FOLLOW UP:  2 months.    Arsalan Camacho MD    Precepted today with Dr. Mcdaniel.

## 2025-02-26 NOTE — PROGRESS NOTES
"Preceptor Attestation:    I discussed the patient with the resident and evaluated the patient in person. I have verified the content of the note, which accurately reflects my assessment of the patient and the plan of care.  Rest tremor of hands noted. Had recent dose adjustment (lowering) of risperidone in effort to diminish the tremor.   \"Secondary parkinson\" label on chart dating back to at least 2016. Was seen by neurology and started sinemet then. Facies masked. Some latency to answering questions and in conversation  Continue to monitor.   Supervising Physician:  Wesotn Mcdaniel MD.  "

## 2025-03-17 ENCOUNTER — NURSING HOME VISIT (OUTPATIENT)
Dept: FAMILY MEDICINE | Facility: CLINIC | Age: 80
End: 2025-03-17
Payer: MEDICARE

## 2025-03-17 DIAGNOSIS — Z78.9 NURSING HOME RESIDENT: Primary | ICD-10-CM
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
## 2025-03-18 NOTE — PROGRESS NOTES
Preceptor Attestation:    I discussed the patient with the resident and evaluated the patient in person. I have verified the content of the note, which accurately reflects my assessment of the patient and the plan of care.   Supervising Physician:  Winsotn Eubanks MD.

## 2025-03-20 ENCOUNTER — TELEPHONE (OUTPATIENT)
Dept: PHARMACY | Facility: CLINIC | Age: 80
End: 2025-03-20
Payer: MEDICARE

## 2025-03-20 RX ORDER — MAGNESIUM HYDROXIDE/ALUMINUM HYDROXICE/SIMETHICONE 120; 1200; 1200 MG/30ML; MG/30ML; MG/30ML
15 SUSPENSION ORAL
COMMUNITY

## 2025-03-20 NOTE — TELEPHONE ENCOUNTER
Medications reconciled to medication list from [facility].     Noy Peres, PharmD   Medication Therapy Management Pharmacy Resident

## 2025-03-31 ENCOUNTER — TRANSFERRED RECORDS (OUTPATIENT)
Dept: MULTI SPECIALTY CLINIC | Facility: CLINIC | Age: 80
End: 2025-03-31

## 2025-03-31 LAB — RETINOPATHY: NORMAL

## 2025-04-07 ENCOUNTER — NURSING HOME VISIT (OUTPATIENT)
Dept: FAMILY MEDICINE | Facility: CLINIC | Age: 80
End: 2025-04-07
Payer: MEDICARE

## 2025-04-07 DIAGNOSIS — Z78.9 NURSING HOME RESIDENT: Primary | ICD-10-CM

## 2025-04-07 DIAGNOSIS — I10 PRIMARY HYPERTENSION: ICD-10-CM

## 2025-04-07 PROBLEM — R45.1 AGITATION: Status: ACTIVE | Noted: 2022-09-13

## 2025-04-07 PROBLEM — I42.9 CARDIOMYOPATHY (H): Status: ACTIVE | Noted: 2018-08-09

## 2025-04-07 PROBLEM — I50.22 CHRONIC SYSTOLIC HEART FAILURE (H): Status: ACTIVE | Noted: 2019-04-09

## 2025-04-07 PROBLEM — Z85.828 HISTORY OF BASAL CELL CARCINOMA (BCC) OF SKIN: Status: ACTIVE | Noted: 2021-08-02

## 2025-04-07 PROBLEM — S09.90XA CLOSED HEAD INJURY: Status: ACTIVE | Noted: 2024-09-13

## 2025-04-07 PROBLEM — E11.9 TYPE II DIABETES MELLITUS, WELL CONTROLLED (H): Status: ACTIVE | Noted: 2020-12-18

## 2025-04-07 PROBLEM — K21.9 GASTROESOPHAGEAL REFLUX DISEASE WITHOUT ESOPHAGITIS: Status: ACTIVE | Noted: 2025-04-07

## 2025-04-07 PROBLEM — R33.9 RETENTION OF URINE: Status: ACTIVE | Noted: 2022-09-13

## 2025-04-07 RX ORDER — MAGNESIUM HYDROXIDE/ALUMINUM HYDROXICE/SIMETHICONE 120; 1200; 1200 MG/30ML; MG/30ML; MG/30ML
15-30 SUSPENSION ORAL
COMMUNITY
Start: 2025-04-07

## 2025-04-07 NOTE — PROGRESS NOTES
Subjective: Ana Gates is a 80 year old who is seen at in the nursing home for follow up visit today.  Seen at [facility] AT [place]   [address]  [facility] [address] .    Acute concerns today include: None  Chronic and Past Medical Problems include:  Patient Active Problem List   Diagnosis    Osteopenia    Psychosis (H)    PTSD (post-traumatic stress disorder)    Dissociative disorder    Somatization disorder    History of fracture of right hip    Depression    Anxiety    Conversion disorder    Gastritis    Eating disorder    Hypertension    Hx of breast cancer    Constipation    Insomnia    Palsy of axillary nerve    Parkinsonism (H)    Delusional disorder, somatic type (H)    Cataracts, bilateral    RLS (restless legs syndrome)    Nursing home resident    Intracranial hemorrhage (H)    COPD (chronic obstructive pulmonary disease) (H)    Agitation    C1 cervical fracture (H)    Cardiomyopathy (H)    Chronic back pain    Chronic neck pain    Fibromyalgia    Chronic systolic heart failure (H)    Closed head injury    Continuous auditory hallucinations    Degeneration of cervical intervertebral disc    Degenerative joint disease of spinal facet joint    History of basal cell carcinoma (BCC) of skin    Lung mass    Migraines    Mitral valve prolapse    Onychomycosis    Retention of urine    SCC (squamous cell carcinoma)    Seizure (H)    Tear film insufficiency    Type II diabetes mellitus, well controlled (H)    Vitamin D deficiency    Gastroesophageal reflux disease without esophagitis     Family History       Problem (# of Occurrences) Relation (Name,Age of Onset)    Kidney Disease (1) Mother    Breast Cancer (1) Mother: 40's    Leukemia (1) Mother    No Known Problems (3) Sister, Daughter, Daughter          Social History:   Current activities:  Watch TV  Support services:  None  Currently living family/friends:  None  PMHX/PSHX/MEDS/ALLERGIES/SHX/FHX reviewed and updated in Epic.   MEDICATION  LIST:  Current Outpatient Medications   Medication Sig Dispense Refill    alum & mag hydroxide-simethicone (MAALOX) 200-200-20 MG/5ML SUSP suspension Take 15-30 mLs by mouth every 3 hours as needed for indigestion.      acetaminophen (TYLENOL) 500 MG tablet Take 500-1,000 mg by mouth every 6 hours as needed for pain And 1000 mg at bedtime      acetaminophen (TYLENOL) 650 MG suppository Place 1 suppository (650 mg) rectally every 4 hours as needed for fever      albuterol (PROAIR HFA/PROVENTIL HFA/VENTOLIN HFA) 108 (90 Base) MCG/ACT inhaler Inhale 2 puffs into the lungs every 4 hours as needed for shortness of breath 18 g     alum & mag hydroxide-simethicone (MAALOX) 200-200-20 MG/5ML SUSP suspension Take 15 mLs by mouth every 3 hours as needed for indigestion.      calcium carbonate (TUMS) 500 MG chewable tablet Take 2 tablets (1,000 mg) by mouth every 3 hours as needed for heartburn      dextran 70-hypromellose (TEARS NATURALE FREE PF) 0.1-0.3 % ophthalmic solution Place 1 drop into both eyes 3 times daily.      escitalopram (LEXAPRO) 20 MG tablet Take 1 tablet (20 mg) by mouth daily      famotidine (PEPCID) 20 MG tablet Take 1 tablet (20 mg) by mouth daily      furosemide (LASIX) 40 MG tablet Take 40 mg by mouth daily      GNP DICLOFENAC SODIUM 1 % topical gel APPLY TOPICALLY 4 GM TO RIGHT KNEE FOUR TIMES DAILY AS NEEDED 100 g PRN    guaiFENesin-dextromethorphan (ROBITUSSIN DM) 100-10 MG/5ML syrup Take 10 mLs by mouth every 4 hours as needed for cough      loperamide 1 MG/7.5ML liquid Give as needed for diarrhea; give 2-4 tsp after first loose still then 2 tsp after each additional stool; do not exceed 8 tsp per 72 hours      melatonin 3 MG tablet Take 3 mg by mouth at bedtime.      metoprolol succinate ER (TOPROL XL) 25 MG 24 hr tablet Take 1 tablet (25 mg) by mouth daily 90 tablet     mirtazapine (REMERON) 15 MG tablet Take 15 mg by mouth at bedtime      nystatin (MYCOSTATIN) 634403 UNIT/GM external powder Apply  topically 2 times daily as needed for other (yeast/redness) Apply to under R breast topically as needed for yeast/redness      risperiDONE (RISPERDAL) 2 MG tablet Take 2 mg by mouth at bedtime.      senna-docusate (SENNOSIDES-DOCUSATE SODIUM) 8.6-50 mg tablet Take 1 tablet by mouth 2 times daily as needed      sodium chloride (OCEAN) 0.65 % nasal spray Spray 1 spray into both nostrils 2 times daily.       No current facility-administered medications for this visit.     Medications are managed by:   NURSE  Patient uses a pill box to manage their medications:  N/A  Patient has questions, concerns, or potential side effects/interactions from their medications:  No  GERIATRIC ROS:    Do you have difficulty getting around, watching TV or reading because of poor eyesight? No has readers, believes she is getting distance glasses  Can you hear normal conversational voice? Yes   Do you use hearing aides? No   Do you have problems with your memory? Yes - affected by ECT   Do you often feel sad or depressed? No   Have you unintentionally lost weight in the last 6 months? No   Do you have trouble with control of your bladder? No   Do you have trouble with control of your bowels? No   Have you had any falls in the past year? No - uses walker    GENERAL ROS:   General: No unexplained weight gain or loss; adequate sleep pattern.  Resp: No worsening shortness of breath, cough or hemoptysis.   GI: No worsening constipation, no diarrhea, no nausea or vomiting  : Voiding independently with no significant incontinence   Skin: No areas of new skin breakdown or worrisome rashes  Extremities:  No pain, extremity weakness or balance troubles    Objective:   Gen: Well nourished and in NAD   HEENT: Head is atraumatic, decent dentition  CV: RRR - no murmurs, rubs, or gallups,   Pulm: CTAB, no wheezes/rales/rhonchi, good air entry   ABD: soft, nontender, BS intact  Extrem: no cyanosis, edema or clubbing   Psych: Euthymic  Neuro: Pt is able  to ambulate independently with walker    Preventative Screening:   Vaccinations reviewed and up to date. Could get COVID vaccine  Code status: DNR/DNI  Healthcare Directive, Living Will, POLST has been completed:  Completed POLST today    Assessment/ Plan:  Nursing home resident  60-day visit completed today. Ana had no acute concerns. Filled out POLST with patient during our visit today.   Primary HTN  Home blood pressures were reviewed. No medication changes necessary today.     RECOMMENDED FOLLOW UP:  2 months.    Total of 20 minutes was spent in face to face contact with patient with > 50% in counseling and coordination of care.  Options for treatment and/or follow-up care were reviewed with the patient. Ana Yajaira was engaged and actively involved in the decision making process. She verbalized understanding of the options discussed and was satisfied with the final plan.      Arsenio Nunn DO

## 2025-05-07 ENCOUNTER — VIRTUAL VISIT (OUTPATIENT)
Dept: FAMILY MEDICINE | Facility: CLINIC | Age: 80
End: 2025-05-07
Payer: MEDICARE

## 2025-05-07 DIAGNOSIS — Z78.9 NURSING HOME RESIDENT: ICD-10-CM

## 2025-05-07 DIAGNOSIS — E11.9 TYPE II DIABETES MELLITUS, WELL CONTROLLED (H): Primary | ICD-10-CM

## 2025-05-07 DIAGNOSIS — F32.A DEPRESSION, UNSPECIFIED DEPRESSION TYPE: ICD-10-CM

## 2025-05-07 NOTE — PROGRESS NOTES
"Ana is a 80 year old who is being evaluated via a billable video visit.    How would you like to obtain your AVS? MyChart  If the video visit is dropped, the invitation should be resent by: Send to e-mail at: No e-mail address on record        Assessment & Plan     COPD (chronic obstructive pulmonary disease) (H)      Type II diabetes mellitus, well controlled (H)    Mood disorder with psychosis  Under court ordered committment for neuroleptic Rx and ECT.  Lives at [facility] and participates in activities there like chair yoga, bingo, aerobics and crafts. Goes out twice  a week for ECT and occasional group shopping trip to [employer].  Does not recall much of past hospitalizations. Slight tremor, not impacting sleep and minimal impact on feeding. Weight \"same\" over the past year. Reports being tired for 24 hours after the ECT.    Review of chart shows significant morbidity in 2022 and 2022 when off therapies.   Flat, anxious affect. Speech deliberate. Insight into her diagnosis, treatment options and the need for close monitoring is limited.   Does not endorse hearing sounds, hearing voices or seeing things that on further probing are not really there.                  Subjective   Ana is a 80 year old, presenting for the following health issues:  Forms      5/7/2025     1:21 PM   Additional Questions   Roomed by KELLY   Accompanied by SELF         5/7/2025    Information    services provided? No     Via the Health Maintenance questionnaire, the patient has reported the following services have been completed -Eye Exam: [employer] 2025-03-13, this information has been sent to the abstraction team.  Video Start Time: 1:38pm    HPI    Under court ordered committment for neuroleptic Rx and ECT.  Lives at [facility] and participates in activities there like chair yoga, bingo, aerobics and crafts. Goes out twice  a week for ECT and occasional group shopping trip to [employer].  Does not recall much " "of past hospitalizations. Slight tremor, not impacting sleep and minimal impact on feeding. Weight \"same\" over the past year. Reports being tired for 24 hours after the ECT.    Review of chart shows significant morbidity in 2022 and 2022 when off therapies.   Flat, anxious affect. Speech deliberate. Insight into her diagnosis, treatment options and the need for close monitoring is limited.   Does not endorse hearing sounds, hearing voices or seeing things that on further probing are not really there.       Objective           Vitals:  No vitals were obtained today due to virtual visit.    Physical Exam   GENERAL: alert and no distress  EYES: Eyes grossly normal to inspection.  No discharge or erythema, or obvious scleral/conjunctival abnormalities.  RESP: No audible wheeze, cough, or visible cyanosis.    SKIN: Visible skin clear. No significant rash, abnormal pigmentation or lesions.  NEURO: Cranial nerves grossly intact.  Mentation and speech appropriate for age.  PSYCH: Appropriate affect, tone, and pace of words          Video-Visit Details    Type of service:  Video Visit   Video End Time:1:58pm  Originating Location (pt. Location): Long term Care    Distant Location (provider location):  On-site  Platform used for Video Visit: Julianne  Signed Electronically by: Weston Mcdaniel MD    "

## 2025-05-13 ENCOUNTER — MEDICAL CORRESPONDENCE (OUTPATIENT)
Dept: HEALTH INFORMATION MANAGEMENT | Facility: CLINIC | Age: 80
End: 2025-05-13
Payer: MEDICARE

## 2025-05-15 ENCOUNTER — MEDICAL CORRESPONDENCE (OUTPATIENT)
Dept: HEALTH INFORMATION MANAGEMENT | Facility: CLINIC | Age: 80
End: 2025-05-15
Payer: MEDICARE

## 2025-05-16 ENCOUNTER — MEDICAL CORRESPONDENCE (OUTPATIENT)
Dept: HEALTH INFORMATION MANAGEMENT | Facility: CLINIC | Age: 80
End: 2025-05-16
Payer: MEDICARE

## 2025-05-20 ENCOUNTER — TELEPHONE (OUTPATIENT)
Dept: FAMILY MEDICINE | Facility: CLINIC | Age: 80
End: 2025-05-20
Payer: MEDICARE

## 2025-05-20 NOTE — TELEPHONE ENCOUNTER
Per RN notes from Alicia Gonzalez RN at [facility] as follows:     Resident had a fall on 5/15 in which she was not using her walker in her room and fell forward when leaning over to  a chocolate and hit her head on the Wardrobe. On 5/16 she slid off the bed and instead of putting on her call light she laid down on the floor.     ELLA Butts

## 2025-05-21 ENCOUNTER — NURSING HOME VISIT (OUTPATIENT)
Dept: FAMILY MEDICINE | Facility: CLINIC | Age: 80
End: 2025-05-21
Payer: MEDICARE

## 2025-05-21 DIAGNOSIS — F32.A DEPRESSION, UNSPECIFIED DEPRESSION TYPE: ICD-10-CM

## 2025-05-21 DIAGNOSIS — Z78.9 NURSING HOME RESIDENT: Primary | ICD-10-CM

## 2025-05-21 DIAGNOSIS — G25.0 ESSENTIAL TREMOR: ICD-10-CM
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
## 2025-05-21 PROCEDURE — 99308 SBSQ NF CARE LOW MDM 20: CPT | Mod: GC

## 2025-05-21 NOTE — PROGRESS NOTES
Subjective: Ana Gates is a 80 year old who is seen at in the nursing home for follow up visit today.  Seen at [facility] AT [place]   [address]  [facility] [address] .    Acute concerns today include: None  She had a mechanical fall on 5/15 that was witnessed. She did hit her head. Today she denies any vision changes, LOC, syncope, chest pain, SOB, slurred speech. She cannot recall the specifics surrounding her fall. 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     Chronic and Past Medical Problems include:      Patient Active Problem List   Diagnosis    Osteopenia    Psychosis (H)    PTSD (post-traumatic stress disorder)    Dissociative disorder    Somatization disorder    History of fracture of right hip    Depression    Anxiety    Conversion disorder    Gastritis    Eating disorder    Hypertension    Hx of breast cancer    Constipation    Insomnia    Palsy of axillary nerve    Parkinsonism (H)    Delusional disorder, somatic type (H)    Cataracts, bilateral    RLS (restless legs syndrome)    Nursing home resident    Intracranial hemorrhage (H)    COPD (chronic obstructive pulmonary disease) (H)    Agitation    C1 cervical fracture (H)    Cardiomyopathy (H)    Chronic back pain    Chronic neck pain    Fibromyalgia    Chronic systolic heart failure (H)    Closed head injury    Continuous auditory hallucinations    Degeneration of cervical intervertebral disc    Degenerative joint disease of spinal facet joint    History of basal cell carcinoma (BCC) of skin    Lung mass    Migraines    Mitral valve prolapse    Onychomycosis    Retention of urine    SCC (squamous cell carcinoma)    Seizure (H)    Tear film insufficiency    Type II diabetes mellitus, well controlled (H)    Vitamin D deficiency    Gastroesophageal reflux disease without esophagitis      Family History            Problem (# of Occurrences) Relation (Name,Age of Onset)     Kidney Disease (1) Mother      Breast Cancer (1) Mother: 40's     Leukemia (1) Mother     No Known Problems (3) Sister, Daughter, Daughter                Social History:   Current activities:  Watch TV  Support services:  None  Currently living family/friends:  None  PMHX/PSHX/MEDS/ALLERGIES/SHX/FHX reviewed and updated in Epic.   MEDICATION LIST:  Current Facility-Administered Medications          Current Outpatient Medications   Medication Sig Dispense Refill    alum & mag hydroxide-simethicone (MAALOX) 200-200-20 MG/5ML SUSP suspension Take 15-30 mLs by mouth every 3 hours as needed for indigestion.        acetaminophen (TYLENOL) 500 MG tablet Take 500-1,000 mg by mouth every 6 hours as needed for pain And 1000 mg at bedtime        acetaminophen (TYLENOL) 650 MG suppository Place 1 suppository (650 mg) rectally every 4 hours as needed for fever        albuterol (PROAIR HFA/PROVENTIL HFA/VENTOLIN HFA) 108 (90 Base) MCG/ACT inhaler Inhale 2 puffs into the lungs every 4 hours as needed for shortness of breath 18 g      alum & mag hydroxide-simethicone (MAALOX) 200-200-20 MG/5ML SUSP suspension Take 15 mLs by mouth every 3 hours as needed for indigestion.        calcium carbonate (TUMS) 500 MG chewable tablet Take 2 tablets (1,000 mg) by mouth every 3 hours as needed for heartburn        dextran 70-hypromellose (TEARS NATURALE FREE PF) 0.1-0.3 % ophthalmic solution Place 1 drop into both eyes 3 times daily.        escitalopram (LEXAPRO) 20 MG tablet Take 1 tablet (20 mg) by mouth daily        famotidine (PEPCID) 20 MG tablet Take 1 tablet (20 mg) by mouth daily        furosemide (LASIX) 40 MG tablet Take 40 mg by mouth daily        GNP DICLOFENAC SODIUM 1 % topical gel APPLY TOPICALLY 4 GM TO RIGHT KNEE FOUR TIMES DAILY AS NEEDED 100 g PRN    guaiFENesin-dextromethorphan (ROBITUSSIN DM) 100-10 MG/5ML syrup Take 10 mLs by mouth every 4 hours as needed for cough        loperamide 1 MG/7.5ML liquid Give as needed for diarrhea; give 2-4 tsp after  first loose still then 2 tsp after each additional stool; do not exceed 8 tsp per 72 hours        melatonin 3 MG tablet Take 3 mg by mouth at bedtime.        metoprolol succinate ER (TOPROL XL) 25 MG 24 hr tablet Take 1 tablet (25 mg) by mouth daily 90 tablet      mirtazapine (REMERON) 15 MG tablet Take 15 mg by mouth at bedtime        nystatin (MYCOSTATIN) 143947 UNIT/GM external powder Apply topically 2 times daily as needed for other (yeast/redness) Apply to under R breast topically as needed for yeast/redness        risperiDONE (RISPERDAL) 2 MG tablet Take 2 mg by mouth at bedtime.        senna-docusate (SENNOSIDES-DOCUSATE SODIUM) 8.6-50 mg tablet Take 1 tablet by mouth 2 times daily as needed        sodium chloride (OCEAN) 0.65 % nasal spray Spray 1 spray into both nostrils 2 times daily.          No current facility-administered medications for this visit.         Medications are managed by:   NURSE  Patient uses a pill box to manage their medications:  N/A  Patient has questions, concerns, or potential side effects/interactions from their medications:  No  GERIATRIC ROS:    Do you have difficulty getting around, watching TV or reading because of poor eyesight? No has readers, believes she is getting distance glasses  Can you hear normal conversational voice? Yes   Do you use hearing aides? No   Do you have problems with your memory? Yes - affected by ECT   Do you often feel sad or depressed? No   Have you unintentionally lost weight in the last 6 months? No   Do you have trouble with control of your bladder? No   Do you have trouble with control of your bowels? No   Have you had any falls in the past year? No - uses walker     GENERAL ROS:   General: No unexplained weight gain or loss; adequate sleep pattern.  Resp: No worsening shortness of breath, cough or hemoptysis.   GI: No worsening constipation, no diarrhea, no nausea or vomiting  : Voiding independently with no significant incontinence   Skin: No  areas of new skin breakdown or worrisome rashes  Extremities:  No pain, extremity weakness or balance troubles     Objective:   Gen: Well nourished and in NAD   HEENT: Head has large ecchymosis without open wound in left frontal scalp . No other bruises or lesions noted. There is mild swelling. C spine without step offs or tendnerness. Full neck ROM. No tongue lacerations.   CV: RRR - no murmurs, rubs, or gallups,   Pulm: CTAB, no wheezes/rales/rhonchi, good air entry   ABD: soft, nontender, BS intact  Extrem: no cyanosis, edema or clubbing   Psych: Euthymic  Neuro: Pt is able to ambulate independently with walker. CN2-12 intact. AO to baseline.      Preventative Screening:   Vaccinations reviewed and up to date. Could get COVID vaccine  Code status: DNR/DNI  Healthcare Directive, Living Will, POLST has been completed     Assessment/ Plan:  Nursing home resident  60-day visit completed today. Ana had no acute concerns.     Fall, subsequent encounter  Sounds like a mechanical fall based on report from staff. No LOC. Her neuro exam is benign today. She does have a significant ecchymosis and some edema of left frontal scalp without any open lacerations. No focal spine tenderness. We discussed indication for head CT and to hold ECT until this is completed. Plan communicated with staff and patient.   -Head CT without contrast     Essential tremor  Stable.     Depression, unspecified  -hold ECT until head CT rules out any acute intracranial pathology      RECOMMENDED FOLLOW UP:  2 months.     Total of  24 minutes was spent in face to face contact with patient with > 50% in counseling and coordination of care.  Options for treatment and/or follow-up care were reviewed with the patient. Ana Gates was engaged and actively involved in the decision making process. She verbalized understanding of the options discussed and was satisfied with the final plan.        Chau Silverio MD PGY2

## 2025-06-10 ENCOUNTER — DOCUMENTATION ONLY (OUTPATIENT)
Dept: FAMILY MEDICINE | Facility: CLINIC | Age: 80
End: 2025-06-10
Payer: MEDICARE

## 2025-06-10 NOTE — PROGRESS NOTES
To be completed in Nursing note:    Please reference list for forms that require a visit for completion.  Please remind patients that providers are given 3-5 business days to complete and return forms.      Form type:  Readmit from hospital    Date form received:  25    Date form completed by Physician:  6/10/25    How was form returned to patient (mailed, faxed, or at  for patient to ):  faxed to [facility] [place] [phone]    Date form mailed/faxed/left at  for patient and sent to HIM for scannin/10/25      Once form is left for patient, faxed, or mailed PCS will then close the documentation only encounter.

## 2025-06-11 ENCOUNTER — TELEPHONE (OUTPATIENT)
Dept: FAMILY MEDICINE | Facility: CLINIC | Age: 80
End: 2025-06-11
Payer: MEDICARE

## 2025-06-11 NOTE — TELEPHONE ENCOUNTER
Per RN notes from Alicia Gonzalez RN at [facility] as follows:     Ana was admitted to the hospital on 5/26/25 after being found unresponsive, hypotensive and hypothermic. CT scan, chest xray and all lab tests did not show an etiology for her unresponsiveness, and it did take a few days for her to be alert enough to eat/drink etc. We believe that it was a possibility that she received another resident's medications which would have included 300 mg of Clozaril  and 0.5 mg of Ativan which could have explained her lethargy. She is back to baseline.        ELLA Butts

## 2025-06-12 ENCOUNTER — NURSING HOME VISIT (OUTPATIENT)
Dept: FAMILY MEDICINE | Facility: CLINIC | Age: 80
End: 2025-06-12
Payer: MEDICARE

## 2025-06-12 DIAGNOSIS — Z78.9 NURSING HOME RESIDENT: Primary | ICD-10-CM

## 2025-06-12 DIAGNOSIS — G20.C PARKINSONISM, UNSPECIFIED PARKINSONISM TYPE (H): ICD-10-CM

## 2025-06-12 DIAGNOSIS — Z09 HOSPITAL DISCHARGE FOLLOW-UP: ICD-10-CM

## 2025-06-12 NOTE — PROGRESS NOTES
Subjective: Ana Gates is a 80 year old who is seen at in the nursing home for follow up visit today.  Seen at [facility] [place]   [address]  [facility] [address] .      Acute concerns today include: None  Was hospitalized from 5/26-5/29 for unexplained unresponsiveness, hypotension, and hypothermia. Believed to be due to receiving another resident's medications.  Patient denies any memory of this happening, stated she remembers being told about her hospitalization.  ECT is being spaced out to every 10 days for treatment resistant depression with psychotic features.    Chronic and Past Medical Problems include:  Patient Active Problem List   Diagnosis    Osteopenia    Psychosis (H)    PTSD (post-traumatic stress disorder)    Dissociative disorder    Somatization disorder    History of fracture of right hip    Depression    Anxiety    Conversion disorder    Gastritis    Eating disorder    Hypertension    Hx of breast cancer    Constipation    Insomnia    Palsy of axillary nerve    Parkinsonism (H)    Delusional disorder, somatic type (H)    Cataracts, bilateral    RLS (restless legs syndrome)    Nursing home resident    Intracranial hemorrhage (H)    COPD (chronic obstructive pulmonary disease) (H)    Agitation    C1 cervical fracture (H)    Cardiomyopathy (H)    Chronic back pain    Chronic neck pain    Fibromyalgia    Chronic systolic heart failure (H)    Closed head injury    Continuous auditory hallucinations    Degeneration of cervical intervertebral disc    Degenerative joint disease of spinal facet joint    History of basal cell carcinoma (BCC) of skin    Lung mass    Migraines    Mitral valve prolapse    Onychomycosis    Retention of urine    SCC (squamous cell carcinoma)    Seizure (H)    Tear film insufficiency    Type II diabetes mellitus, well controlled (H)    Vitamin D deficiency    Gastroesophageal reflux disease without esophagitis     Family History       Problem (# of Occurrences)  Relation (Name,Age of Onset)    Kidney Disease (1) Mother    Breast Cancer (1) Mother: 40's    Leukemia (1) Mother    No Known Problems (3) Sister, Daughter, Daughter          Social History:   Current activities:  Watching TV  Support services:  No  PMHX/PSHX/MEDS/ALLERGIES/SHX/FHX reviewed and updated in Epic.   MEDICATION LIST:  Current Outpatient Medications   Medication Sig Dispense Refill    acetaminophen (TYLENOL) 500 MG tablet Take 500-1,000 mg by mouth every 6 hours as needed for pain And 1000 mg at bedtime      acetaminophen (TYLENOL) 650 MG suppository Place 1 suppository (650 mg) rectally every 4 hours as needed for fever      albuterol (PROAIR HFA/PROVENTIL HFA/VENTOLIN HFA) 108 (90 Base) MCG/ACT inhaler Inhale 2 puffs into the lungs every 4 hours as needed for shortness of breath 18 g     alum & mag hydroxide-simethicone (MAALOX) 200-200-20 MG/5ML SUSP suspension Take 15-30 mLs by mouth every 3 hours as needed for indigestion.      alum & mag hydroxide-simethicone (MAALOX) 200-200-20 MG/5ML SUSP suspension Take 15 mLs by mouth every 3 hours as needed for indigestion.      calcium carbonate (TUMS) 500 MG chewable tablet Take 2 tablets (1,000 mg) by mouth every 3 hours as needed for heartburn      dextran 70-hypromellose (TEARS NATURALE FREE PF) 0.1-0.3 % ophthalmic solution Place 1 drop into both eyes 3 times daily.      escitalopram (LEXAPRO) 20 MG tablet Take 1 tablet (20 mg) by mouth daily      famotidine (PEPCID) 20 MG tablet Take 1 tablet (20 mg) by mouth daily      furosemide (LASIX) 40 MG tablet Take 40 mg by mouth daily      GNP DICLOFENAC SODIUM 1 % topical gel APPLY TOPICALLY 4 GM TO RIGHT KNEE FOUR TIMES DAILY AS NEEDED 100 g PRN    guaiFENesin-dextromethorphan (ROBITUSSIN DM) 100-10 MG/5ML syrup Take 10 mLs by mouth every 4 hours as needed for cough      loperamide 1 MG/7.5ML liquid Give as needed for diarrhea; give 2-4 tsp after first loose still then 2 tsp after each additional stool; do  not exceed 8 tsp per 72 hours      melatonin 3 MG tablet Take 3 mg by mouth at bedtime.      metoprolol succinate ER (TOPROL XL) 25 MG 24 hr tablet Take 1 tablet (25 mg) by mouth daily 90 tablet     mirtazapine (REMERON) 15 MG tablet Take 15 mg by mouth at bedtime      nystatin (MYCOSTATIN) 831022 UNIT/GM external powder Apply topically 2 times daily as needed for other (yeast/redness) Apply to under R breast topically as needed for yeast/redness      risperiDONE (RISPERDAL) 2 MG tablet Take 2 mg by mouth at bedtime.      senna-docusate (SENNOSIDES-DOCUSATE SODIUM) 8.6-50 mg tablet Take 1 tablet by mouth 2 times daily as needed      sodium chloride (OCEAN) 0.65 % nasal spray Spray 1 spray into both nostrils 2 times daily.       No current facility-administered medications for this visit.     Medications are managed by:  NURSE  Patient uses a pill box to manage their medications:  N/A  Patient has questions, concerns, or potential side effects/interactions from their medications:  No  GERIATRIC ROS:    Do you have difficulty getting around, watching TV or reading because of poor eyesight? No   Can you hear normal conversational voice? Yes  Do you use hearing aides? No   Do you have problems with your memory? Yes  Do you often feel sad or depressed? No   Have you unintentionally lost weight in the last 6 months? No   Do you have trouble with control of your bladder? No   Do you have trouble with control of your bowels? No     GENERAL ROS:   General: No unexplained weight gain or loss; adequate sleep pattern.  Resp: No worsening shortness of breath, cough or hemoptysis.   GI: No worsening constipation, no diarrhea, no nausea or vomiting  : Voiding independently with no significant incontinence   Skin: No areas of new skin breakdown or worrisome rashes  Extremities:  No pain, extremity weakness or balance troubles    Objective: There were no vitals taken for this visit.   Most recent weight:  ***  Gen: Well nourished  and in NAD   HEENT: Head is atraumatic, decent dentition  CV: RRR - no murmurs, rubs, or gallups,   Pulm: CTAB, no wheezes/rales/rhonchi, good air entry   ABD: soft, nontender, BS intact  Extrem: no cyanosis, edema or clubbing   Psych: Euthymic  Neuro: Pt is able to ambulate independently    Preventative Screening:   Vaccinations reviewed and up to date ***  Get up and Go test:  Completed/Not Applicable.  Time:  ***  Additional Recommended Screening: ***    POA: *** Phone number: ***  Code status: ***  Healthcare Directive, Living Will, POLST has been completed:  ***     Assessment/ Plan:  1. Nursing home resident (Primary)  ***    2. Hospital discharge follow-up  ***    3. Parkinsonism, unspecified Parkinsonism type (H)  ***       RECOMMENDED FOLLOW UP:  2 months.    Total of *** minutes was spent in face to face contact with patient with > 50% in counseling and coordination of care.  Options for treatment and/or follow-up care were reviewed with the patient. Ana Gates was engaged and actively involved in the decision making process. She verbalized understanding of the options discussed and was satisfied with the final plan.      Arsenio Galo, DO

## 2025-08-25 ENCOUNTER — TELEPHONE (OUTPATIENT)
Dept: FAMILY MEDICINE | Facility: CLINIC | Age: 80
End: 2025-08-25
Payer: MEDICARE

## 2025-08-27 ENCOUNTER — NURSING HOME VISIT (OUTPATIENT)
Dept: FAMILY MEDICINE | Facility: CLINIC | Age: 80
End: 2025-08-27
Payer: MEDICARE